# Patient Record
Sex: FEMALE | Race: WHITE | NOT HISPANIC OR LATINO | Employment: UNEMPLOYED | URBAN - METROPOLITAN AREA
[De-identification: names, ages, dates, MRNs, and addresses within clinical notes are randomized per-mention and may not be internally consistent; named-entity substitution may affect disease eponyms.]

---

## 2023-03-08 PROBLEM — R31.29 HEMATURIA, MICROSCOPIC: Status: ACTIVE | Noted: 2023-03-08

## 2023-03-08 PROBLEM — H01.123: Status: ACTIVE | Noted: 2023-03-08

## 2023-03-08 PROBLEM — N32.81 OAB (OVERACTIVE BLADDER): Status: ACTIVE | Noted: 2023-03-08

## 2023-03-08 PROBLEM — R91.1 LUNG NODULE: Status: ACTIVE | Noted: 2023-03-08

## 2023-03-08 PROBLEM — M48.02 CERVICAL SPINAL STENOSIS: Status: ACTIVE | Noted: 2023-03-08

## 2023-03-08 PROBLEM — M54.9 BACK PAIN WITH RADIATION: Status: ACTIVE | Noted: 2023-03-08

## 2023-03-08 PROBLEM — M32.9 SYSTEMIC LUPUS ERYTHEMATOSUS (MULTI): Status: ACTIVE | Noted: 2023-03-08

## 2023-03-08 PROBLEM — F32.A DEPRESSION: Status: ACTIVE | Noted: 2023-03-08

## 2023-03-08 PROBLEM — T88.7XXA MEDICATION SIDE EFFECT: Status: ACTIVE | Noted: 2023-03-08

## 2023-03-08 PROBLEM — M79.2 NEURITIS: Status: ACTIVE | Noted: 2023-03-08

## 2023-03-08 PROBLEM — K21.9 GERD (GASTROESOPHAGEAL REFLUX DISEASE): Status: ACTIVE | Noted: 2023-03-08

## 2023-03-08 PROBLEM — I10 HYPERTENSION: Status: ACTIVE | Noted: 2023-03-08

## 2023-03-08 PROBLEM — I25.10 CORONARY ARTERY DISEASE: Status: ACTIVE | Noted: 2023-03-08

## 2023-03-08 PROBLEM — F41.9 ANXIETY: Status: ACTIVE | Noted: 2023-03-08

## 2023-03-08 PROBLEM — M20.21 HALLUX RIGIDUS OF RIGHT FOOT: Status: ACTIVE | Noted: 2023-03-08

## 2023-03-08 PROBLEM — H01.126: Status: ACTIVE | Noted: 2023-03-08

## 2023-03-08 RX ORDER — METOPROLOL SUCCINATE 50 MG/1
1 TABLET, EXTENDED RELEASE ORAL DAILY
COMMUNITY
Start: 2019-03-06 | End: 2023-08-07

## 2023-03-08 RX ORDER — TRIAMCINOLONE ACETONIDE 1 MG/G
CREAM TOPICAL
COMMUNITY
Start: 2017-10-10

## 2023-03-08 RX ORDER — HYDROGEN PEROXIDE 3 %
SOLUTION, NON-ORAL MISCELLANEOUS
COMMUNITY
Start: 2021-02-24 | End: 2023-04-26

## 2023-03-09 ENCOUNTER — OFFICE VISIT (OUTPATIENT)
Dept: PRIMARY CARE | Facility: CLINIC | Age: 58
End: 2023-03-09
Payer: MEDICARE

## 2023-03-09 VITALS
BODY MASS INDEX: 28.73 KG/M2 | WEIGHT: 167.38 LBS | DIASTOLIC BLOOD PRESSURE: 90 MMHG | TEMPERATURE: 97.2 F | SYSTOLIC BLOOD PRESSURE: 160 MMHG

## 2023-03-09 DIAGNOSIS — M54.41 ACUTE MIDLINE LOW BACK PAIN WITH RIGHT-SIDED SCIATICA: ICD-10-CM

## 2023-03-09 DIAGNOSIS — K59.00 CONSTIPATION, UNSPECIFIED CONSTIPATION TYPE: Primary | ICD-10-CM

## 2023-03-09 PROCEDURE — 3077F SYST BP >= 140 MM HG: CPT | Performed by: INTERNAL MEDICINE

## 2023-03-09 PROCEDURE — 99213 OFFICE O/P EST LOW 20 MIN: CPT | Performed by: INTERNAL MEDICINE

## 2023-03-09 PROCEDURE — 3080F DIAST BP >= 90 MM HG: CPT | Performed by: INTERNAL MEDICINE

## 2023-03-09 PROCEDURE — 1036F TOBACCO NON-USER: CPT | Performed by: INTERNAL MEDICINE

## 2023-03-09 RX ORDER — LIDOCAINE 560 MG/1
1 PATCH PERCUTANEOUS; TOPICAL; TRANSDERMAL DAILY
Qty: 14 PATCH | Refills: 0 | Status: SHIPPED | OUTPATIENT
Start: 2023-03-09 | End: 2023-03-16

## 2023-03-09 RX ORDER — SUCRALFATE 1 G/1
1 TABLET ORAL EVERY 12 HOURS
COMMUNITY
Start: 2023-01-12 | End: 2023-04-14

## 2023-03-09 RX ORDER — ATORVASTATIN CALCIUM 40 MG/1
40 TABLET, FILM COATED ORAL DAILY
COMMUNITY
Start: 2023-02-22 | End: 2023-05-22

## 2023-03-09 RX ORDER — DOCUSATE SODIUM 100 MG/1
100 CAPSULE, LIQUID FILLED ORAL 2 TIMES DAILY
Qty: 60 CAPSULE | Refills: 0 | Status: SHIPPED | OUTPATIENT
Start: 2023-03-09 | End: 2023-04-08

## 2023-03-09 RX ORDER — TIZANIDINE 2 MG/1
2 TABLET ORAL EVERY 6 HOURS PRN
COMMUNITY
Start: 2023-03-07 | End: 2023-11-03 | Stop reason: WASHOUT

## 2023-03-09 RX ORDER — HYDROXYCHLOROQUINE SULFATE 200 MG/1
200 TABLET, FILM COATED ORAL DAILY
COMMUNITY
Start: 2023-01-08 | End: 2023-10-12

## 2023-03-09 ASSESSMENT — ENCOUNTER SYMPTOMS
MUSCULOSKELETAL NEGATIVE: 1
ALLERGIC/IMMUNOLOGIC NEGATIVE: 1
CARDIOVASCULAR NEGATIVE: 1
RESPIRATORY NEGATIVE: 1
GASTROINTESTINAL NEGATIVE: 1
CONSTITUTIONAL NEGATIVE: 1
ENDOCRINE NEGATIVE: 1
EYES NEGATIVE: 1
HEMATOLOGIC/LYMPHATIC NEGATIVE: 1
NEUROLOGICAL NEGATIVE: 1
PSYCHIATRIC NEGATIVE: 1

## 2023-03-09 ASSESSMENT — PAIN SCALES - GENERAL: PAINLEVEL: 10-WORST PAIN EVER

## 2023-03-09 ASSESSMENT — VISUAL ACUITY: OU: 1

## 2023-03-09 NOTE — ASSESSMENT & PLAN NOTE
Patient went to the urgent care clinic followed by [facility].  She had chronic back pain and she is given prescription for pain medication.  She has been scheduled to see a pain clinic with Dr. Arrieta next Tuesday.

## 2023-03-09 NOTE — ASSESSMENT & PLAN NOTE
She had a chronic constipation.  Advised to take fruits and vegetables.  Colace 100 mg twice daily ordered for 2 months.

## 2023-03-09 NOTE — PROGRESS NOTES
Subjective   Patient ID: [id number]   Neuropathy          Christa Christie is a 57 y.o. female who presents for Sciatica (Since Tuesday ).  Patient having chronic low back pain radiating towards the right buttock and knee.  It has been going for a while.  Today she went to [facility] and yesterday she went to urgent care clinic.  She does not have bowel bother movement problem.  She is scheduled to see pain clinic in future.        Objective   Visit Vitals  /90   Temp 36.2 °C (97.2 °F)      Review of Systems   Constitutional: Negative.    HENT: Negative.     Eyes: Negative.    Respiratory: Negative.     Cardiovascular: Negative.    Gastrointestinal: Negative.    Endocrine: Negative.    Genitourinary: Negative.    Musculoskeletal: Negative.         Low back pain.   Skin: Negative.    Allergic/Immunologic: Negative.    Neurological: Negative.    Hematological: Negative.    Psychiatric/Behavioral: Negative.       Physical Exam  Constitutional:       Appearance: Normal appearance. She is well-developed and normal weight.   HENT:      Head: Normocephalic and atraumatic.      Right Ear: Tympanic membrane and ear canal normal.      Left Ear: Tympanic membrane and ear canal normal.      Mouth/Throat:      Mouth: Mucous membranes are moist.      Pharynx: Oropharynx is clear.   Eyes:      General: Lids are normal. Lids are everted, no foreign bodies appreciated. Vision grossly intact.      Extraocular Movements: Extraocular movements intact.      Pupils: Pupils are equal, round, and reactive to light.   Neck:      Thyroid: No thyroid mass or thyroid tenderness.      Trachea: Trachea and phonation normal.   Cardiovascular:      Rate and Rhythm: Normal rate and regular rhythm.      Pulses:           Carotid pulses are 1+ on the right side and 1+ on the left side.       Radial pulses are 1+ on the right side and 1+ on the left side.        Femoral pulses are 1+ on the right side and 1+ on the left side.        Popliteal pulses are 1+ on the right side.      Heart sounds: Normal heart sounds, S1 normal and S2 normal.      No S3 sounds.   Pulmonary:      Effort: Pulmonary effort is normal.      Breath sounds: Normal breath sounds and air entry.   Abdominal:      General: Abdomen is flat. Bowel sounds are normal. There is no distension.      Palpations: Abdomen is soft. There is no mass.      Tenderness: There is no abdominal tenderness.   Musculoskeletal:         General: No swelling. Normal range of motion.      Cervical back: Normal range of motion and neck supple. No edema.      Right lower leg: No edema.   Lymphadenopathy:      Cervical: No cervical adenopathy.      Right cervical: No superficial cervical adenopathy.     Left cervical: No superficial cervical adenopathy.   Skin:     General: Skin is warm and dry.   Neurological:      General: No focal deficit present.      Mental Status: She is alert. Mental status is at baseline.   Psychiatric:         Attention and Perception: Attention normal.         Mood and Affect: Mood normal.         Behavior: Behavior is cooperative.         Assessment/Plan   Problem List Items Addressed This Visit          Digestive    Constipation - Primary     She had a chronic constipation.  Advised to take fruits and vegetables.  Colace 100 mg twice daily ordered for 2 months.         Relevant Medications    docusate sodium (Colace) 100 mg capsule       Other    Acute midline low back pain with right-sided sciatica     Patient went to the urgent care clinic followed by [facility].  She had chronic back pain and she is given prescription for pain medication.  She has been scheduled to see a pain clinic with Dr. Arrieta next Tuesday.         Relevant Medications    lidocaine (Salonpas, lidocaine,) 4 % patch       Anitha Ascencio MD

## 2023-04-13 DIAGNOSIS — K21.9 GASTRO-ESOPHAGEAL REFLUX DISEASE WITHOUT ESOPHAGITIS: ICD-10-CM

## 2023-04-14 RX ORDER — SUCRALFATE 1 G/1
TABLET ORAL
Qty: 180 TABLET | Refills: 0 | Status: SHIPPED | OUTPATIENT
Start: 2023-04-14 | End: 2023-07-11

## 2023-04-26 DIAGNOSIS — K21.9 GASTRO-ESOPHAGEAL REFLUX DISEASE WITHOUT ESOPHAGITIS: ICD-10-CM

## 2023-04-26 RX ORDER — HYDROGEN PEROXIDE 3 %
SOLUTION, NON-ORAL MISCELLANEOUS
Qty: 90 CAPSULE | Refills: 3 | Status: SHIPPED | OUTPATIENT
Start: 2023-04-26 | End: 2024-04-22

## 2023-05-03 LAB
ANION GAP IN SER/PLAS: 11 MMOL/L (ref 10–20)
CALCIUM (MG/DL) IN SER/PLAS: 9.9 MG/DL (ref 8.6–10.6)
CARBON DIOXIDE, TOTAL (MMOL/L) IN SER/PLAS: 31 MMOL/L (ref 21–32)
CHLORIDE (MMOL/L) IN SER/PLAS: 102 MMOL/L (ref 98–107)
CREATININE (MG/DL) IN SER/PLAS: 1.09 MG/DL (ref 0.5–1.05)
ERYTHROCYTE DISTRIBUTION WIDTH (RATIO) BY AUTOMATED COUNT: 11.6 % (ref 11.5–14.5)
ERYTHROCYTE MEAN CORPUSCULAR HEMOGLOBIN CONCENTRATION (G/DL) BY AUTOMATED: 33.2 G/DL (ref 32–36)
ERYTHROCYTE MEAN CORPUSCULAR VOLUME (FL) BY AUTOMATED COUNT: 99 FL (ref 80–100)
ERYTHROCYTES (10*6/UL) IN BLOOD BY AUTOMATED COUNT: 4.01 X10E12/L (ref 4–5.2)
GFR FEMALE: 59 ML/MIN/1.73M2
GLUCOSE (MG/DL) IN SER/PLAS: 100 MG/DL (ref 74–99)
HEMATOCRIT (%) IN BLOOD BY AUTOMATED COUNT: 39.8 % (ref 36–46)
HEMOGLOBIN (G/DL) IN BLOOD: 13.2 G/DL (ref 12–16)
LEUKOCYTES (10*3/UL) IN BLOOD BY AUTOMATED COUNT: 4.7 X10E9/L (ref 4.4–11.3)
NRBC (PER 100 WBCS) BY AUTOMATED COUNT: 0 /100 WBC (ref 0–0)
PLATELETS (10*3/UL) IN BLOOD AUTOMATED COUNT: 192 X10E9/L (ref 150–450)
POTASSIUM (MMOL/L) IN SER/PLAS: 4.3 MMOL/L (ref 3.5–5.3)
SODIUM (MMOL/L) IN SER/PLAS: 140 MMOL/L (ref 136–145)
UREA NITROGEN (MG/DL) IN SER/PLAS: 15 MG/DL (ref 6–23)

## 2023-05-21 DIAGNOSIS — I25.10 ATHEROSCLEROTIC HEART DISEASE OF NATIVE CORONARY ARTERY WITHOUT ANGINA PECTORIS: ICD-10-CM

## 2023-05-22 RX ORDER — ATORVASTATIN CALCIUM 40 MG/1
TABLET, FILM COATED ORAL
Qty: 90 TABLET | Refills: 0 | Status: SHIPPED | OUTPATIENT
Start: 2023-05-22 | End: 2023-08-21

## 2023-05-23 ENCOUNTER — OFFICE VISIT (OUTPATIENT)
Dept: PRIMARY CARE | Facility: CLINIC | Age: 58
End: 2023-05-23
Payer: MEDICARE

## 2023-05-23 VITALS
TEMPERATURE: 98 F | SYSTOLIC BLOOD PRESSURE: 124 MMHG | BODY MASS INDEX: 27.88 KG/M2 | WEIGHT: 162.4 LBS | DIASTOLIC BLOOD PRESSURE: 80 MMHG

## 2023-05-23 DIAGNOSIS — E78.2 MIXED HYPERLIPIDEMIA: Primary | ICD-10-CM

## 2023-05-23 PROCEDURE — 1036F TOBACCO NON-USER: CPT | Performed by: NURSE PRACTITIONER

## 2023-05-23 PROCEDURE — 3074F SYST BP LT 130 MM HG: CPT | Performed by: NURSE PRACTITIONER

## 2023-05-23 PROCEDURE — 3079F DIAST BP 80-89 MM HG: CPT | Performed by: NURSE PRACTITIONER

## 2023-05-23 PROCEDURE — 99214 OFFICE O/P EST MOD 30 MIN: CPT | Performed by: NURSE PRACTITIONER

## 2023-05-23 RX ORDER — ASPIRIN 81 MG/1
81 TABLET ORAL
COMMUNITY

## 2023-05-23 ASSESSMENT — PATIENT HEALTH QUESTIONNAIRE - PHQ9
SUM OF ALL RESPONSES TO PHQ9 QUESTIONS 1 AND 2: 0
2. FEELING DOWN, DEPRESSED OR HOPELESS: NOT AT ALL
1. LITTLE INTEREST OR PLEASURE IN DOING THINGS: NOT AT ALL

## 2023-05-23 NOTE — PROGRESS NOTES
Subjective   Patient ID: Christa Christie is a 57 y.o. female who presents for Follow-up.    HPI   In PT Saw Dr. Hoang  and Dr. Arrieta about low back.  Has not done labs.  Denies side effects to current medications.    Feeling OK day to day except some swelling in feet on and off.  No specific pattern.  Could do better with water intake.   [facility] for [facility] - no mail order    Review of Systems   All other systems reviewed and are negative.        Objective   /80   Temp 36.7 °C (98 °F)   Wt 73.7 kg (162 lb 6.4 oz)   BMI 27.88 kg/m²     Physical Exam  Vitals and nursing note reviewed.   Constitutional:       Appearance: Normal appearance.   HENT:      Right Ear: Tympanic membrane, ear canal and external ear normal.      Left Ear: Tympanic membrane, ear canal and external ear normal.      Mouth/Throat:      Pharynx: Oropharynx is clear.   Neck:      Thyroid: No thyroid mass, thyromegaly or thyroid tenderness.   Cardiovascular:      Rate and Rhythm: Normal rate and regular rhythm.      Pulses: Normal pulses.      Heart sounds: Normal heart sounds.   Pulmonary:      Effort: Pulmonary effort is normal.      Breath sounds: Normal breath sounds.   Abdominal:      General: Bowel sounds are normal.      Palpations: Abdomen is soft.   Musculoskeletal:      Cervical back: Normal range of motion and neck supple.   Lymphadenopathy:      Cervical: No cervical adenopathy.   Skin:     General: Skin is warm.   Neurological:      Mental Status: She is alert and oriented to person, place, and time. Mental status is at baseline.   Psychiatric:         Mood and Affect: Mood normal.         Behavior: Behavior normal.         Thought Content: Thought content normal.         Judgment: Judgment normal.         Assessment/Plan   Problem List Items Addressed This Visit       Mixed hyperlipidemia - Primary     Check labs          Talked about hydration and fluids.

## 2023-05-23 NOTE — PATIENT INSTRUCTIONS
Nice to see you today.  Labs after fasting 8 hours, hydrate and no supplements before the labs.  I will follow up via My Chart.  [facility]

## 2023-05-24 LAB
ALANINE AMINOTRANSFERASE (SGPT) (U/L) IN SER/PLAS: 19 U/L (ref 7–45)
ALBUMIN (G/DL) IN SER/PLAS: 4.3 G/DL (ref 3.4–5)
ALKALINE PHOSPHATASE (U/L) IN SER/PLAS: 68 U/L (ref 33–110)
ANION GAP IN SER/PLAS: 13 MMOL/L (ref 10–20)
ASPARTATE AMINOTRANSFERASE (SGOT) (U/L) IN SER/PLAS: 24 U/L (ref 9–39)
BILIRUBIN TOTAL (MG/DL) IN SER/PLAS: 0.8 MG/DL (ref 0–1.2)
CALCIUM (MG/DL) IN SER/PLAS: 9.5 MG/DL (ref 8.6–10.3)
CARBON DIOXIDE, TOTAL (MMOL/L) IN SER/PLAS: 29 MMOL/L (ref 21–32)
CHLORIDE (MMOL/L) IN SER/PLAS: 103 MMOL/L (ref 98–107)
CHOLESTEROL (MG/DL) IN SER/PLAS: 137 MG/DL (ref 0–199)
CHOLESTEROL IN HDL (MG/DL) IN SER/PLAS: 64.7 MG/DL
CHOLESTEROL/HDL RATIO: 2.1
CREATININE (MG/DL) IN SER/PLAS: 0.8 MG/DL (ref 0.5–1.05)
GFR FEMALE: 85 ML/MIN/1.73M2
GLUCOSE (MG/DL) IN SER/PLAS: 90 MG/DL (ref 74–99)
LDL: 59 MG/DL (ref 0–99)
POTASSIUM (MMOL/L) IN SER/PLAS: 4.1 MMOL/L (ref 3.5–5.3)
PROTEIN TOTAL: 7 G/DL (ref 6.4–8.2)
SODIUM (MMOL/L) IN SER/PLAS: 141 MMOL/L (ref 136–145)
TRIGLYCERIDE (MG/DL) IN SER/PLAS: 66 MG/DL (ref 0–149)
UREA NITROGEN (MG/DL) IN SER/PLAS: 15 MG/DL (ref 6–23)
VLDL: 13 MG/DL (ref 0–40)

## 2023-05-26 NOTE — RESULT ENCOUNTER NOTE
Please let pt know that labs look great.  Continue with the current dose of Statin.  She should follow up in 6 months (please schedule)  Let me know if she needs any other Rx

## 2023-07-10 DIAGNOSIS — K21.9 GASTRO-ESOPHAGEAL REFLUX DISEASE WITHOUT ESOPHAGITIS: ICD-10-CM

## 2023-07-11 RX ORDER — SUCRALFATE 1 G/1
TABLET ORAL
Qty: 180 TABLET | Refills: 0 | Status: SHIPPED | OUTPATIENT
Start: 2023-07-11 | End: 2023-10-11

## 2023-08-05 DIAGNOSIS — I10 ESSENTIAL (PRIMARY) HYPERTENSION: ICD-10-CM

## 2023-08-07 RX ORDER — METOPROLOL SUCCINATE 50 MG/1
50 TABLET, EXTENDED RELEASE ORAL DAILY
Qty: 90 TABLET | Refills: 1 | Status: SHIPPED | OUTPATIENT
Start: 2023-08-07 | End: 2024-02-05

## 2023-08-20 DIAGNOSIS — I25.10 ATHEROSCLEROTIC HEART DISEASE OF NATIVE CORONARY ARTERY WITHOUT ANGINA PECTORIS: ICD-10-CM

## 2023-08-21 RX ORDER — ATORVASTATIN CALCIUM 40 MG/1
TABLET, FILM COATED ORAL
Qty: 90 TABLET | Refills: 1 | Status: SHIPPED | OUTPATIENT
Start: 2023-08-21 | End: 2024-02-19

## 2023-10-11 DIAGNOSIS — K21.9 GASTRO-ESOPHAGEAL REFLUX DISEASE WITHOUT ESOPHAGITIS: ICD-10-CM

## 2023-10-11 RX ORDER — SUCRALFATE 1 G/1
TABLET ORAL
Qty: 180 TABLET | Refills: 0 | Status: SHIPPED | OUTPATIENT
Start: 2023-10-11 | End: 2023-12-21 | Stop reason: WASHOUT

## 2023-10-12 DIAGNOSIS — M32.9 SYSTEMIC LUPUS ERYTHEMATOSUS, UNSPECIFIED (MULTI): ICD-10-CM

## 2023-10-12 RX ORDER — HYDROXYCHLOROQUINE SULFATE 200 MG/1
TABLET, FILM COATED ORAL
Qty: 135 TABLET | Refills: 0 | Status: SHIPPED | OUTPATIENT
Start: 2023-10-12 | End: 2024-01-08

## 2023-11-03 ENCOUNTER — OFFICE VISIT (OUTPATIENT)
Dept: RHEUMATOLOGY | Facility: CLINIC | Age: 58
End: 2023-11-03
Payer: MEDICARE

## 2023-11-03 ENCOUNTER — LAB (OUTPATIENT)
Dept: LAB | Facility: LAB | Age: 58
End: 2023-11-03
Payer: MEDICARE

## 2023-11-03 VITALS — BODY MASS INDEX: 28.32 KG/M2 | SYSTOLIC BLOOD PRESSURE: 121 MMHG | DIASTOLIC BLOOD PRESSURE: 78 MMHG | WEIGHT: 165 LBS

## 2023-11-03 DIAGNOSIS — H01.126: ICD-10-CM

## 2023-11-03 DIAGNOSIS — H01.126: Primary | ICD-10-CM

## 2023-11-03 DIAGNOSIS — M32.8 OTHER FORMS OF SYSTEMIC LUPUS ERYTHEMATOSUS, UNSPECIFIED ORGAN INVOLVEMENT STATUS (MULTI): ICD-10-CM

## 2023-11-03 LAB
ALBUMIN SERPL BCP-MCNC: 4.4 G/DL (ref 3.4–5)
ALP SERPL-CCNC: 71 U/L (ref 33–110)
ALT SERPL W P-5'-P-CCNC: 19 U/L (ref 7–45)
ANION GAP SERPL CALC-SCNC: 14 MMOL/L (ref 10–20)
AST SERPL W P-5'-P-CCNC: 23 U/L (ref 9–39)
BILIRUB SERPL-MCNC: 0.8 MG/DL (ref 0–1.2)
BUN SERPL-MCNC: 14 MG/DL (ref 6–23)
C3 SERPL-MCNC: 115 MG/DL (ref 87–200)
C4 SERPL-MCNC: 27 MG/DL (ref 10–50)
CALCIUM SERPL-MCNC: 9.5 MG/DL (ref 8.6–10.6)
CHLORIDE SERPL-SCNC: 102 MMOL/L (ref 98–107)
CO2 SERPL-SCNC: 29 MMOL/L (ref 21–32)
CREAT SERPL-MCNC: 0.75 MG/DL (ref 0.5–1.05)
DSDNA AB SER-ACNC: <1 IU/ML
ERYTHROCYTE [DISTWIDTH] IN BLOOD BY AUTOMATED COUNT: 11.1 % (ref 11.5–14.5)
ERYTHROCYTE [SEDIMENTATION RATE] IN BLOOD BY WESTERGREN METHOD: 4 MM/H (ref 0–30)
GFR SERPL CREATININE-BSD FRML MDRD: >90 ML/MIN/1.73M*2
GLUCOSE SERPL-MCNC: 103 MG/DL (ref 74–99)
HCT VFR BLD AUTO: 37.8 % (ref 36–46)
HGB BLD-MCNC: 12.5 G/DL (ref 12–16)
MCH RBC QN AUTO: 34.5 PG (ref 26–34)
MCHC RBC AUTO-ENTMCNC: 33.1 G/DL (ref 32–36)
MCV RBC AUTO: 104 FL (ref 80–100)
NRBC BLD-RTO: 0 /100 WBCS (ref 0–0)
PLATELET # BLD AUTO: 198 X10*3/UL (ref 150–450)
POTASSIUM SERPL-SCNC: 4.3 MMOL/L (ref 3.5–5.3)
PROT SERPL-MCNC: 7 G/DL (ref 6.4–8.2)
RBC # BLD AUTO: 3.62 X10*6/UL (ref 4–5.2)
SODIUM SERPL-SCNC: 141 MMOL/L (ref 136–145)
WBC # BLD AUTO: 5.8 X10*3/UL (ref 4.4–11.3)

## 2023-11-03 PROCEDURE — 1036F TOBACCO NON-USER: CPT | Performed by: INTERNAL MEDICINE

## 2023-11-03 PROCEDURE — 85027 COMPLETE CBC AUTOMATED: CPT

## 2023-11-03 PROCEDURE — 86160 COMPLEMENT ANTIGEN: CPT

## 2023-11-03 PROCEDURE — 80053 COMPREHEN METABOLIC PANEL: CPT

## 2023-11-03 PROCEDURE — 3078F DIAST BP <80 MM HG: CPT | Performed by: INTERNAL MEDICINE

## 2023-11-03 PROCEDURE — 86225 DNA ANTIBODY NATIVE: CPT

## 2023-11-03 PROCEDURE — 85652 RBC SED RATE AUTOMATED: CPT

## 2023-11-03 PROCEDURE — 99213 OFFICE O/P EST LOW 20 MIN: CPT | Performed by: INTERNAL MEDICINE

## 2023-11-03 PROCEDURE — 3074F SYST BP LT 130 MM HG: CPT | Performed by: INTERNAL MEDICINE

## 2023-11-03 PROCEDURE — 36415 COLL VENOUS BLD VENIPUNCTURE: CPT

## 2023-11-03 NOTE — PROGRESS NOTES
"Subjective   Patient ID: Christa Christie is a 58 y.o. female who presents for Follow-up.    HPI.  58-year-old female with history of discoid lupus presented for follow-up.    She has no more discoid lupus rashes.    Right hand numbness improved after using the wrist brace.    Immunosuppression: Hydroxychloroquine.(5/2017).    Review of Systems   All other systems reviewed and are negative.    Objective .      3/14/2023     1:04 PM 4/25/2023     1:50 PM 5/3/2023     2:13 PM 5/15/2023     2:31 PM 5/23/2023     1:09 PM 7/3/2023     1:12 PM 11/3/2023     2:00 PM   Vitals   Systolic 147 151 122 132 124 139 121   Diastolic 77 82 79 75 80 75 78   Heart Rate 67 69  65  66    Temp 37.1 °C (98.7 °F) 36.6 °C (97.8 °F)  36.8 °C (98.2 °F) 36.7 °C (98 °F) 35.9 °C (96.6 °F)    Resp 16 16  18  18    Height (in)   1.626 m (5' 4\")   1.626 m (5' 4\")    Weight (lb)   166  162.4 166 165   BMI   28.49 kg/m2  27.88 kg/m2 28.49 kg/m2 28.32 kg/m2   BSA (m2)   1.84 m2  1.82 m2 1.84 m2 1.84 m2   Visit Report     Report  Report      Physical Exam.  Gen. AAO x3, NAD.  HEENT: No pallor or icterus, PERRLA, EOMI. No cervical lymphadenopathy .  Skin: No rashes.  Heart: S1, S2/ RRR.   Lungs: CTA B.  Abdomen: Soft, NT/ND  MSK: No swollen or tender joint.  Tinel's negative.  Neuro:  Sensation to touch intact.Strength 5/5 throughout.   Psych:Appropriate mood and behavior  EXT: No edema    Assessment/Plan   58-year-old female with history of discoid lupus presented for follow-up.    #1: Discoid lupus.  She is doing well.  -Continue hydroxychloroquine 300 mg daily.  Eye exam up to date.  -Labs.    #2: Possible right carpal tunnel syndrome.  -Continue to use wrist brace.    Follow-up in 6 months.     This note was partially generated using the Dragon Voice recognition system. There may be some incorrect wording, spelling and/or spelling errors or punctuation errors that were not corrected prior to committing the note to the medical record.    Patient " Active Problem List   Diagnosis    Anxiety    Bilateral asymmetric sensorineural hearing loss    Coronary artery disease    Cervical spinal stenosis    Depression    Discoid lupus erythematosus of left eyelid    Discoid lupus erythematosus of right eyelid    GERD (gastroesophageal reflux disease)    Hallux rigidus of right foot    Hematuria, microscopic    Hypertension    Back pain with radiation    Lung nodule    Medication side effect    Neuritis    OAB (overactive bladder)    Systemic lupus erythematosus (CMS/HCC)    Constipation    Acute midline low back pain with right-sided sciatica    Mixed hyperlipidemia      Past Surgical History:   Procedure Laterality Date    BACK SURGERY  10/22/2014    Back Surgery     SECTION, CLASSIC  2022     Section    OTHER SURGICAL HISTORY  06/10/2020    Tonsillectomy with adenoidectomy    OTHER SURGICAL HISTORY  06/10/2020    Foot surgery      Social History     Tobacco Use    Smoking status: Former     Packs/day: 1.00     Years: 30.00     Additional pack years: 0.00     Total pack years: 30.00     Types: Cigarettes     Quit date: 2017     Years since quittin.8     Passive exposure: Past    Smokeless tobacco: Never   Substance Use Topics    Alcohol use: Yes     Alcohol/week: 2.0 standard drinks of alcohol     Types: 2 Glasses of wine per week      Family History   Problem Relation Name Age of Onset    Other (cardiac disorder) Mother      Lung cancer Mother  85    Other (cardiac disorder) Father        No Known Allergies   Current Outpatient Medications   Medication Instructions    aspirin 81 mg, oral, Daily RT    atorvastatin (Lipitor) 40 mg tablet TAKE 1 TABLET BY MOUTH EVERY DAY    esomeprazole (NexIUM) 20 mg DR capsule TAKE 1 CAPSULE BY MOUTH ONCE DAILY BEFORE EATING    hydroxychloroquine (Plaquenil) 200 mg tablet TAKE 1& 1/2 TABLETS BY MOUTH DAILY    metoprolol succinate XL (TOPROL-XL) 50 mg, oral, Daily    sucralfate (Carafate) 1 gram tablet TAKE  1 TABLET BY MOUTH EVERY 12 HOURS DAILY    triamcinolone (Kenalog) 0.1 % cream APPLY AND RUB IN A THIN FILM TO AFFECTED AREAS TWICE DAILY.(AM AND PM).

## 2023-11-03 NOTE — PATIENT INSTRUCTIONS
Continue hydroxychloroquine 1-1/2 pill daily.  Use wrist brace at night.  Call me if any question.  Follow-up in 6 months.

## 2023-12-08 ENCOUNTER — OFFICE VISIT (OUTPATIENT)
Dept: PRIMARY CARE | Facility: CLINIC | Age: 58
End: 2023-12-08
Payer: MEDICARE

## 2023-12-08 VITALS
WEIGHT: 162 LBS | TEMPERATURE: 97.8 F | SYSTOLIC BLOOD PRESSURE: 120 MMHG | DIASTOLIC BLOOD PRESSURE: 78 MMHG | BODY MASS INDEX: 27.81 KG/M2 | OXYGEN SATURATION: 96 % | HEART RATE: 66 BPM

## 2023-12-08 DIAGNOSIS — R19.4 BOWEL HABIT CHANGES: Primary | ICD-10-CM

## 2023-12-08 PROBLEM — M53.3 SACROILIAC PAIN: Status: ACTIVE | Noted: 2023-12-08

## 2023-12-08 PROBLEM — M43.16 ANTEROLISTHESIS OF LUMBAR SPINE: Status: ACTIVE | Noted: 2023-12-08

## 2023-12-08 PROBLEM — M54.16 RIGHT LUMBAR RADICULOPATHY: Status: ACTIVE | Noted: 2023-12-08

## 2023-12-08 PROBLEM — I25.10 CAD (CORONARY ARTERY DISEASE): Status: ACTIVE | Noted: 2023-12-08

## 2023-12-08 PROBLEM — M70.61 TROCHANTERIC BURSITIS OF RIGHT HIP: Status: ACTIVE | Noted: 2023-12-08

## 2023-12-08 PROBLEM — R10.31 SEVERE RT GROIN PAIN: Status: ACTIVE | Noted: 2023-12-08

## 2023-12-08 PROCEDURE — 1036F TOBACCO NON-USER: CPT | Performed by: NURSE PRACTITIONER

## 2023-12-08 PROCEDURE — 99214 OFFICE O/P EST MOD 30 MIN: CPT | Performed by: NURSE PRACTITIONER

## 2023-12-08 PROCEDURE — 3078F DIAST BP <80 MM HG: CPT | Performed by: NURSE PRACTITIONER

## 2023-12-08 PROCEDURE — 3074F SYST BP LT 130 MM HG: CPT | Performed by: NURSE PRACTITIONER

## 2023-12-08 NOTE — PROGRESS NOTES
Subjective   Patient ID: Christa Christie is a 58 y.o. female who presents for Constipation (For the the last month, then last last and this morning had diarrhea ).    HPI   Had some constipation and then today - diarrhea.  About a month ago was constipated  When she had BM  small balls of stool  Last Friday took stool softener and then had BM Friday night  Then back to back more normal stools   Then Diarrhea last night and this AM  Not sure what to do   No vomiting or nausea    Toast and spray margarine   Goes to the [employer] rides the bike  Left overs for lunch  Apple trisketts celery  Chicken, Lots of vegetables for dinner    Years ago Urologist recommended a bulking agent b/c was having bladder symptoms due to constipation.   Water - 4-5 per day  Tea 2 per day  Nocturia x 1    Review of Systems   Gastrointestinal:  Positive for constipation and diarrhea.   All other systems reviewed and are negative.      Objective   /78   Pulse 66   Temp 36.6 °C (97.8 °F)   Wt 73.5 kg (162 lb)   SpO2 96%   BMI 27.81 kg/m²     Physical Exam  Vitals and nursing note reviewed.   Constitutional:       Appearance: Normal appearance.   HENT:      Head: Normocephalic and atraumatic.      Right Ear: Tympanic membrane, ear canal and external ear normal.      Left Ear: Tympanic membrane, ear canal and external ear normal.      Mouth/Throat:      Pharynx: Oropharynx is clear.   Neck:      Thyroid: No thyroid mass, thyromegaly or thyroid tenderness.   Cardiovascular:      Rate and Rhythm: Normal rate and regular rhythm.      Pulses: Normal pulses.      Heart sounds: Normal heart sounds.   Pulmonary:      Effort: Pulmonary effort is normal.      Breath sounds: Normal breath sounds.   Abdominal:      General: Bowel sounds are normal.      Palpations: Abdomen is soft.   Neurological:      Mental Status: She is alert and oriented to person, place, and time.   Psychiatric:         Mood and Affect: Mood normal.         Behavior:  Behavior normal.         Assessment/Plan   Problem List Items Addressed This Visit             ICD-10-CM    Bowel habit changes - Primary R19.4   Make a slurry of Metamucil/Benefiber or MiraLax   Consistently use this to bulk the stools and keep them more regular.

## 2023-12-21 PROBLEM — R19.4 BOWEL HABIT CHANGES: Status: ACTIVE | Noted: 2023-03-09

## 2023-12-21 ASSESSMENT — ENCOUNTER SYMPTOMS
DIARRHEA: 1
CONSTIPATION: 1

## 2023-12-21 NOTE — PATIENT INSTRUCTIONS
Make a slurry of Metamucil/Benefiber or MiraLax   Consistently use this to bulk the stools and keep them more regular.  Stay well hydrated  If not improving let me know.

## 2024-01-08 DIAGNOSIS — M32.9 SYSTEMIC LUPUS ERYTHEMATOSUS, UNSPECIFIED (MULTI): ICD-10-CM

## 2024-01-08 RX ORDER — HYDROXYCHLOROQUINE SULFATE 200 MG/1
TABLET, FILM COATED ORAL
Qty: 135 TABLET | Refills: 0 | Status: SHIPPED | OUTPATIENT
Start: 2024-01-08 | End: 2024-04-09 | Stop reason: SDUPTHER

## 2024-01-12 ENCOUNTER — TELEPHONE (OUTPATIENT)
Dept: RHEUMATOLOGY | Facility: CLINIC | Age: 59
End: 2024-01-12
Payer: MEDICARE

## 2024-01-15 DIAGNOSIS — R19.4 BOWEL HABIT CHANGES: Primary | ICD-10-CM

## 2024-01-16 RX ORDER — SUCRALFATE 1 G/10ML
1 SUSPENSION ORAL 4 TIMES DAILY
COMMUNITY
End: 2024-01-16 | Stop reason: SDUPTHER

## 2024-01-17 RX ORDER — SUCRALFATE 1 G/10ML
1 SUSPENSION ORAL EVERY 12 HOURS
Qty: 90 ML | Refills: 0 | Status: SHIPPED | OUTPATIENT
Start: 2024-01-17 | End: 2024-01-25 | Stop reason: WASHOUT

## 2024-01-22 ENCOUNTER — TELEPHONE (OUTPATIENT)
Dept: PRIMARY CARE | Facility: CLINIC | Age: 59
End: 2024-01-22
Payer: MEDICARE

## 2024-01-22 NOTE — TELEPHONE ENCOUNTER
Patient would like to know if she could get sucralfate in tablet form. She only has a couple doses left of the liquid that was prescribed for her.

## 2024-01-25 DIAGNOSIS — R19.4 BOWEL HABIT CHANGES: Primary | ICD-10-CM

## 2024-01-25 RX ORDER — SUCRALFATE 1 G/1
1 TABLET ORAL
Qty: 180 TABLET | Refills: 0 | Status: SHIPPED | OUTPATIENT
Start: 2024-01-25 | End: 2024-04-17

## 2024-02-03 DIAGNOSIS — I10 ESSENTIAL (PRIMARY) HYPERTENSION: ICD-10-CM

## 2024-02-05 RX ORDER — METOPROLOL SUCCINATE 50 MG/1
50 TABLET, EXTENDED RELEASE ORAL DAILY
Qty: 90 TABLET | Refills: 1 | Status: SHIPPED | OUTPATIENT
Start: 2024-02-05

## 2024-02-17 DIAGNOSIS — I25.10 ATHEROSCLEROTIC HEART DISEASE OF NATIVE CORONARY ARTERY WITHOUT ANGINA PECTORIS: ICD-10-CM

## 2024-02-19 RX ORDER — ATORVASTATIN CALCIUM 40 MG/1
TABLET, FILM COATED ORAL
Qty: 90 TABLET | Refills: 1 | Status: SHIPPED | OUTPATIENT
Start: 2024-02-19 | End: 2024-05-19

## 2024-02-19 NOTE — TELEPHONE ENCOUNTER
Called and left pt a detailed message that she is due for labs and an apt. Let her know to call and ach apt.

## 2024-03-03 DIAGNOSIS — Z87.891 FORMER SMOKER: Primary | ICD-10-CM

## 2024-03-03 DIAGNOSIS — R91.1 LUNG NODULE: ICD-10-CM

## 2024-03-19 ENCOUNTER — HOSPITAL ENCOUNTER (OUTPATIENT)
Dept: RADIOLOGY | Facility: CLINIC | Age: 59
Discharge: HOME | End: 2024-03-19
Payer: MEDICARE

## 2024-03-19 DIAGNOSIS — R91.1 LUNG NODULE: ICD-10-CM

## 2024-03-19 DIAGNOSIS — Z87.891 FORMER SMOKER: ICD-10-CM

## 2024-03-19 PROCEDURE — 71271 CT THORAX LUNG CANCER SCR C-: CPT

## 2024-04-09 ENCOUNTER — TELEPHONE (OUTPATIENT)
Dept: RHEUMATOLOGY | Facility: CLINIC | Age: 59
End: 2024-04-09
Payer: MEDICARE

## 2024-04-09 DIAGNOSIS — M32.9 SYSTEMIC LUPUS ERYTHEMATOSUS, UNSPECIFIED (MULTI): ICD-10-CM

## 2024-04-09 RX ORDER — HYDROXYCHLOROQUINE SULFATE 200 MG/1
TABLET, FILM COATED ORAL
Qty: 135 TABLET | Refills: 0 | Status: SHIPPED | OUTPATIENT
Start: 2024-04-09

## 2024-04-09 NOTE — TELEPHONE ENCOUNTER
Patient called for a refill on    Hydroxychloroquine 200 mg #135 take 1 and 1/2 tablets daily    [phone]

## 2024-04-16 DIAGNOSIS — R19.4 BOWEL HABIT CHANGES: ICD-10-CM

## 2024-04-17 RX ORDER — SUCRALFATE 1 G/1
1 TABLET ORAL
Qty: 180 TABLET | Refills: 0 | Status: SHIPPED | OUTPATIENT
Start: 2024-04-17 | End: 2025-04-17

## 2024-04-20 DIAGNOSIS — K21.9 GASTRO-ESOPHAGEAL REFLUX DISEASE WITHOUT ESOPHAGITIS: ICD-10-CM

## 2024-04-22 RX ORDER — HYDROGEN PEROXIDE 3 %
SOLUTION, NON-ORAL MISCELLANEOUS
Qty: 90 CAPSULE | Refills: 3 | Status: SHIPPED | OUTPATIENT
Start: 2024-04-22

## 2024-04-29 ENCOUNTER — OFFICE VISIT (OUTPATIENT)
Dept: PRIMARY CARE | Facility: CLINIC | Age: 59
End: 2024-04-29
Payer: MEDICARE

## 2024-04-29 VITALS
DIASTOLIC BLOOD PRESSURE: 70 MMHG | SYSTOLIC BLOOD PRESSURE: 120 MMHG | TEMPERATURE: 97.7 F | WEIGHT: 166 LBS | BODY MASS INDEX: 28.34 KG/M2 | HEIGHT: 64 IN

## 2024-04-29 DIAGNOSIS — Z12.31 ENCOUNTER FOR SCREENING MAMMOGRAM FOR MALIGNANT NEOPLASM OF BREAST: ICD-10-CM

## 2024-04-29 DIAGNOSIS — Z00.00 MEDICARE ANNUAL WELLNESS VISIT, SUBSEQUENT: Primary | ICD-10-CM

## 2024-04-29 DIAGNOSIS — M32.9 SYSTEMIC LUPUS ERYTHEMATOSUS, UNSPECIFIED SLE TYPE, UNSPECIFIED ORGAN INVOLVEMENT STATUS (MULTI): ICD-10-CM

## 2024-04-29 DIAGNOSIS — R91.1 LUNG NODULE: ICD-10-CM

## 2024-04-29 DIAGNOSIS — G61.9 INFLAMMATORY NEUROPATHY (MULTI): ICD-10-CM

## 2024-04-29 DIAGNOSIS — H01.126: ICD-10-CM

## 2024-04-29 DIAGNOSIS — M48.02 CERVICAL SPINAL STENOSIS: ICD-10-CM

## 2024-04-29 DIAGNOSIS — I25.10 CORONARY ARTERY DISEASE INVOLVING NATIVE HEART WITHOUT ANGINA PECTORIS, UNSPECIFIED VESSEL OR LESION TYPE: ICD-10-CM

## 2024-04-29 DIAGNOSIS — F19.21 HISTORY OF SUBSTANCE DEPENDENCE (MULTI): ICD-10-CM

## 2024-04-29 DIAGNOSIS — J01.00 ACUTE NON-RECURRENT MAXILLARY SINUSITIS: ICD-10-CM

## 2024-04-29 PROCEDURE — G0439 PPPS, SUBSEQ VISIT: HCPCS | Performed by: FAMILY MEDICINE

## 2024-04-29 PROCEDURE — 3078F DIAST BP <80 MM HG: CPT | Performed by: FAMILY MEDICINE

## 2024-04-29 PROCEDURE — 99214 OFFICE O/P EST MOD 30 MIN: CPT | Performed by: FAMILY MEDICINE

## 2024-04-29 PROCEDURE — 3074F SYST BP LT 130 MM HG: CPT | Performed by: FAMILY MEDICINE

## 2024-04-29 PROCEDURE — 1036F TOBACCO NON-USER: CPT | Performed by: FAMILY MEDICINE

## 2024-04-29 RX ORDER — AMOXICILLIN AND CLAVULANATE POTASSIUM 875; 125 MG/1; MG/1
875 TABLET, FILM COATED ORAL 2 TIMES DAILY
Qty: 14 TABLET | Refills: 0 | Status: SHIPPED | OUTPATIENT
Start: 2024-04-29 | End: 2024-05-06

## 2024-04-29 ASSESSMENT — PATIENT HEALTH QUESTIONNAIRE - PHQ9
SUM OF ALL RESPONSES TO PHQ9 QUESTIONS 1 AND 2: 0
1. LITTLE INTEREST OR PLEASURE IN DOING THINGS: NOT AT ALL
2. FEELING DOWN, DEPRESSED OR HOPELESS: NOT AT ALL
1. LITTLE INTEREST OR PLEASURE IN DOING THINGS: NOT AT ALL
SUM OF ALL RESPONSES TO PHQ9 QUESTIONS 1 AND 2: 0
2. FEELING DOWN, DEPRESSED OR HOPELESS: NOT AT ALL

## 2024-04-29 ASSESSMENT — ENCOUNTER SYMPTOMS
NAUSEA: 0
SLEEP DISTURBANCE: 0
ACTIVITY CHANGE: 0
SHORTNESS OF BREATH: 0
WOUND: 0
SINUS PAIN: 1
DIARRHEA: 0
APPETITE CHANGE: 0
CONSTIPATION: 1
NERVOUS/ANXIOUS: 0
EYE PAIN: 0
BLOOD IN STOOL: 0
DYSURIA: 0
LIGHT-HEADEDNESS: 0
ABDOMINAL PAIN: 0
DIZZINESS: 0
COUGH: 1
SORE THROAT: 0
DYSPHORIC MOOD: 0
JOINT SWELLING: 0
ARTHRALGIAS: 0
FATIGUE: 0
PALPITATIONS: 0
VOMITING: 0
HEADACHES: 0
SINUS PRESSURE: 1

## 2024-04-29 ASSESSMENT — ACTIVITIES OF DAILY LIVING (ADL)
TAKING_MEDICATION: INDEPENDENT
DRESSING: INDEPENDENT
GROCERY_SHOPPING: INDEPENDENT
DOING_HOUSEWORK: INDEPENDENT
MANAGING_FINANCES: INDEPENDENT
BATHING: INDEPENDENT

## 2024-04-29 NOTE — PROGRESS NOTES
Subjective   Reason for Visit: Christa Christie is an 58 y.o. female here for a Medicare Wellness visit.     **Care team  Rheumatology- Dr. Strauss- following for lupus - sx well controlled by pt report   optho- on Plaquenil   ENT/Audiology- following regularly due to chronic hearing loss. wears hearing aides   GI- Dr. Chang     GERD- had egd in past 2016. Symptoms have been better on on nexium   protonix didn't help   Working on food changes for constipation. Still aheing some hard stools. Known to dr Aquino but no recent appt      Hypertension- well-controlled on metoprolol. No concerning side effects     Hyperlipidemia and asymptomatic coronary artery atherosclerosis-tolerating aspirin and atorvastatin without any issues. No bleeding concerns     Lung nodules- hx smoking quit in 2017- due 3/25     mammo due   pap done 8/22 NILM/ HPV negative   Colonoscopy 2021- due 2026    Sick x 2 weeks  Still with drainage and productive cough in am   No meds     Past Medical, Surgical, and Family History reviewed and updated in chart.    Reviewed all medications by prescribing practitioner or clinical pharmacist (such as prescriptions, OTCs, herbal therapies and supplements) and documented in the medical record.    HPI    Patient Self Assessment of Health Status  Patient Self Assessment: Fair    Nutrition and Exercise  Current Diet: Well Balanced Diet  Adequate Fluid Intake: Yes  Caffeine: Yes  Exercise Frequency: Regularly    Functional Ability/Level of Safety  Cognitive Impairment Observed: No cognitive impairment observed  Cognitive Impairment Reported: No cognitive impairment reported by patient or family    Home Safety Risk Factors: None    Patient Care Team:  Troy Da Silva DO as PCP - General  Troy Da Silva DO as PCP - [employer] PCP     Review of Systems   Constitutional:  Negative for activity change, appetite change and fatigue.   HENT:  Positive for hearing loss (chronic), postnasal drip, sinus  "pressure and sinus pain. Negative for congestion, ear discharge, ear pain, sore throat and tinnitus.    Eyes:  Negative for pain and visual disturbance.   Respiratory:  Positive for cough. Negative for shortness of breath.    Cardiovascular:  Negative for chest pain, palpitations and leg swelling.   Gastrointestinal:  Positive for constipation. Negative for abdominal pain, blood in stool, diarrhea, nausea and vomiting.   Endocrine: Negative for cold intolerance and heat intolerance.   Genitourinary:  Negative for dysuria and menstrual problem.   Musculoskeletal:  Negative for arthralgias and joint swelling.   Skin:  Negative for rash and wound.   Neurological:  Negative for dizziness, light-headedness and headaches.   Psychiatric/Behavioral:  Negative for dysphoric mood and sleep disturbance. The patient is not nervous/anxious.        Objective   Vitals:  /70   Temp 36.5 °C (97.7 °F)   Ht 1.626 m (5' 4\")   Wt 75.3 kg (166 lb)   BMI 28.49 kg/m²       Physical Exam  Vitals and nursing note reviewed.   Constitutional:       Appearance: Normal appearance. She is normal weight.   HENT:      Head: Normocephalic and atraumatic.      Right Ear: External ear normal.      Left Ear: External ear normal.      Ears:      Comments: Hearing aides in     Nose:      Right Sinus: Maxillary sinus tenderness present. No frontal sinus tenderness.      Left Sinus: Maxillary sinus tenderness present. No frontal sinus tenderness.      Mouth/Throat:      Mouth: Mucous membranes are moist.   Eyes:      Conjunctiva/sclera: Conjunctivae normal.   Cardiovascular:      Rate and Rhythm: Normal rate and regular rhythm.      Pulses: Normal pulses.      Heart sounds: Normal heart sounds.   Pulmonary:      Effort: Pulmonary effort is normal.      Breath sounds: Normal breath sounds.   Abdominal:      General: Abdomen is flat. Bowel sounds are normal.      Palpations: Abdomen is soft.   Musculoskeletal:         General: No swelling.      " Cervical back: Normal range of motion and neck supple.      Right lower leg: No edema.      Left lower leg: No edema.   Lymphadenopathy:      Cervical: No cervical adenopathy.   Skin:     General: Skin is warm and dry.      Capillary Refill: Capillary refill takes less than 2 seconds.   Neurological:      General: No focal deficit present.      Mental Status: She is alert. Mental status is at baseline.      Deep Tendon Reflexes: Reflexes normal.   Psychiatric:         Mood and Affect: Mood normal.         Behavior: Behavior normal.         Thought Content: Thought content normal.         Judgment: Judgment normal.         Assessment/Plan   Problem List Items Addressed This Visit       Coronary artery disease    Relevant Orders    Referral to Cardiology    Lipid Panel    Comprehensive Metabolic Panel    Cervical spinal stenosis    Relevant Orders    Disability Placard    Discoid lupus erythematosus of left eyelid    Lung nodule    Systemic lupus erythematosus (Multi)    History of substance dependence (Multi)     Other Visit Diagnoses       Medicare annual wellness visit, subsequent    -  Primary    Relevant Orders    BI mammo bilateral screening tomosynthesis    Encounter for screening mammogram for malignant neoplasm of breast        Relevant Orders    BI mammo bilateral screening tomosynthesis    Acute non-recurrent maxillary sinusitis        Relevant Medications    amoxicillin-pot clavulanate (Augmentin) 875-125 mg tablet    Inflammatory neuropathy (Multi)              Overall doing well   Tx sinus infection as above. Call INB  Cont work on diet for hard stools- back to GI INB  Ref cardio for severe cAD on CT. No sx  Lung nodule ct due 3/25

## 2024-04-30 ENCOUNTER — LAB (OUTPATIENT)
Dept: LAB | Facility: LAB | Age: 59
End: 2024-04-30
Payer: MEDICARE

## 2024-04-30 DIAGNOSIS — I25.10 CORONARY ARTERY DISEASE INVOLVING NATIVE HEART WITHOUT ANGINA PECTORIS, UNSPECIFIED VESSEL OR LESION TYPE: ICD-10-CM

## 2024-04-30 LAB
ALBUMIN SERPL BCP-MCNC: 4.2 G/DL (ref 3.4–5)
ALP SERPL-CCNC: 69 U/L (ref 33–110)
ALT SERPL W P-5'-P-CCNC: 16 U/L (ref 7–45)
ANION GAP SERPL CALC-SCNC: 10 MMOL/L (ref 10–20)
AST SERPL W P-5'-P-CCNC: 21 U/L (ref 9–39)
BILIRUB SERPL-MCNC: 0.7 MG/DL (ref 0–1.2)
BUN SERPL-MCNC: 18 MG/DL (ref 6–23)
CALCIUM SERPL-MCNC: 9.5 MG/DL (ref 8.6–10.3)
CHLORIDE SERPL-SCNC: 104 MMOL/L (ref 98–107)
CHOLEST SERPL-MCNC: 150 MG/DL (ref 0–199)
CHOLESTEROL/HDL RATIO: 2.3
CO2 SERPL-SCNC: 30 MMOL/L (ref 21–32)
CREAT SERPL-MCNC: 0.84 MG/DL (ref 0.5–1.05)
EGFRCR SERPLBLD CKD-EPI 2021: 80 ML/MIN/1.73M*2
GLUCOSE SERPL-MCNC: 101 MG/DL (ref 74–99)
HDLC SERPL-MCNC: 66.5 MG/DL
LDLC SERPL CALC-MCNC: 68 MG/DL
NON HDL CHOLESTEROL: 84 MG/DL (ref 0–149)
POTASSIUM SERPL-SCNC: 4.4 MMOL/L (ref 3.5–5.3)
PROT SERPL-MCNC: 7 G/DL (ref 6.4–8.2)
SODIUM SERPL-SCNC: 140 MMOL/L (ref 136–145)
TRIGL SERPL-MCNC: 78 MG/DL (ref 0–149)
VLDL: 16 MG/DL (ref 0–40)

## 2024-04-30 PROCEDURE — 80053 COMPREHEN METABOLIC PANEL: CPT

## 2024-04-30 PROCEDURE — 80061 LIPID PANEL: CPT

## 2024-04-30 PROCEDURE — 36415 COLL VENOUS BLD VENIPUNCTURE: CPT

## 2024-05-02 ENCOUNTER — TELEPHONE (OUTPATIENT)
Dept: PRIMARY CARE | Facility: CLINIC | Age: 59
End: 2024-05-02
Payer: MEDICARE

## 2024-05-02 NOTE — TELEPHONE ENCOUNTER
Okay per HIPAA to leave a detailed message.  Left voicemail relaying message from provider. Updated appt note for next visit to IO A1C

## 2024-05-02 NOTE — TELEPHONE ENCOUNTER
----- Message from Troy Da Silva DO sent at 5/2/2024 11:25 AM EDT -----  Please call the patient regarding her abnormal result.  Let patient know labs overall okay.  Sugar at upper end of normal.  I think we should do an IO A1c to see the average of her sugar at her next follow-up.  Can you please note this for her appointment

## 2024-05-03 ENCOUNTER — LAB (OUTPATIENT)
Dept: LAB | Facility: LAB | Age: 59
End: 2024-05-03
Payer: MEDICARE

## 2024-05-03 ENCOUNTER — OFFICE VISIT (OUTPATIENT)
Dept: RHEUMATOLOGY | Facility: CLINIC | Age: 59
End: 2024-05-03
Payer: MEDICARE

## 2024-05-03 VITALS — DIASTOLIC BLOOD PRESSURE: 78 MMHG | WEIGHT: 166 LBS | SYSTOLIC BLOOD PRESSURE: 130 MMHG | BODY MASS INDEX: 28.49 KG/M2

## 2024-05-03 DIAGNOSIS — M32.19 SYSTEMIC LUPUS ERYTHEMATOSUS WITH OTHER ORGAN INVOLVEMENT, UNSPECIFIED SLE TYPE (MULTI): ICD-10-CM

## 2024-05-03 DIAGNOSIS — H01.126: ICD-10-CM

## 2024-05-03 DIAGNOSIS — H01.126: Primary | ICD-10-CM

## 2024-05-03 LAB
ERYTHROCYTE [DISTWIDTH] IN BLOOD BY AUTOMATED COUNT: 11.4 % (ref 11.5–14.5)
HCT VFR BLD AUTO: 37.9 % (ref 36–46)
HGB BLD-MCNC: 12.9 G/DL (ref 12–16)
MCH RBC QN AUTO: 33.9 PG (ref 26–34)
MCHC RBC AUTO-ENTMCNC: 34 G/DL (ref 32–36)
MCV RBC AUTO: 100 FL (ref 80–100)
NRBC BLD-RTO: 0 /100 WBCS (ref 0–0)
PLATELET # BLD AUTO: 203 X10*3/UL (ref 150–450)
RBC # BLD AUTO: 3.8 X10*6/UL (ref 4–5.2)
WBC # BLD AUTO: 5.3 X10*3/UL (ref 4.4–11.3)

## 2024-05-03 PROCEDURE — 1036F TOBACCO NON-USER: CPT | Performed by: INTERNAL MEDICINE

## 2024-05-03 PROCEDURE — 99213 OFFICE O/P EST LOW 20 MIN: CPT | Performed by: INTERNAL MEDICINE

## 2024-05-03 PROCEDURE — 85027 COMPLETE CBC AUTOMATED: CPT

## 2024-05-03 PROCEDURE — 3078F DIAST BP <80 MM HG: CPT | Performed by: INTERNAL MEDICINE

## 2024-05-03 PROCEDURE — 3075F SYST BP GE 130 - 139MM HG: CPT | Performed by: INTERNAL MEDICINE

## 2024-05-03 PROCEDURE — 36415 COLL VENOUS BLD VENIPUNCTURE: CPT

## 2024-05-03 NOTE — PATIENT INSTRUCTIONS
Continue hydroxychloroquine 1 and half pill daily.  Call me if any question.  Follow-up in 6 months or sooner if needed.

## 2024-05-03 NOTE — PROGRESS NOTES
Subjective . Christa Christie is a 58 y.o. female who presents for Follow-up and Lupus.    Lupus    .58-year-old female with history of discoid lupus presented for follow-up.   She states that she is feeling fine.  She has no rash involving the eyelids and temples.  However eyelid drooping.  She is following ophthalmology and medical follow-up eyelid lift surgery.      Immunosuppression: Hydroxychloroquine.(5/2017).       Review of Systems   All other systems reviewed and are negative.    Objective     Blood pressure 130/78, weight 75.3 kg (166 lb).    Physical Exam.  Gen. AAO x3, NAD.  HEENT: No pallor or icterus, PERRLA, EOMI.  No cervical lymphadenopathy .  Skin: No rashes.  Heart: S1, S2/ RRR.   Lungs: CTA B.  Abdomen: Soft, NT/ND.  MSK: No swollen or tender joint.   Neuro: Sensation to touch intact.Strength 5/5 throughout.   Psych:Appropriate mood and behavior  EXT: No edema    Assessment/Plan  . .58-year-old female with history of discoid lupus presented for follow-up.       #1: Discoid lupus.  Stable.  -Continue hydroxychloroquine 1 and half pill daily.  Eye exam up-to-date.  -Labs.    Follow-up in 6 months.     This note was partially generated using the Dragon Voice recognition system. There may be some incorrect wording, spelling and/or spelling errors or punctuation errors that were not corrected prior to committing the note to the medical record.      Problem List Items Addressed This Visit       Discoid lupus erythematosus of left eyelid - Primary    Relevant Orders    CBC    Systemic lupus erythematosus (Multi)    Relevant Orders    CBC            Active Ambulatory Problems     Diagnosis Date Noted    Anxiety 03/08/2023    Bilateral asymmetric sensorineural hearing loss 03/08/2023    Coronary artery disease 03/08/2023    Cervical spinal stenosis 03/08/2023    Depression 03/08/2023    Discoid lupus erythematosus of left eyelid 03/08/2023    Discoid lupus erythematosus of right eyelid 03/08/2023    GERD  (gastroesophageal reflux disease) 2023    Hallux rigidus of right foot 2023    Hematuria, microscopic 2023    Hypertension 2023    Back pain with radiation 2023    Lung nodule 2023    Medication side effect 2023    Neuritis 2023    OAB (overactive bladder) 2023    Systemic lupus erythematosus (Multi) 2023    Bowel habit changes 2023    Acute midline low back pain with right-sided sciatica 2023    Mixed hyperlipidemia 2023    Anterolisthesis of lumbar spine 2023    Right lumbar radiculopathy 2023    Sacroiliac pain 2023    Severe rt groin pain 2023    Trochanteric bursitis of right hip 2023    CAD (coronary artery disease) 2023    Discoid lupus erythematosus 2008    History of substance dependence (Multi) 2024     Resolved Ambulatory Problems     Diagnosis Date Noted    No Resolved Ambulatory Problems     Past Medical History:   Diagnosis Date    Encounter for gynecological examination (general) (routine) without abnormal findings 2019    Encounter for gynecological examination (general) (routine) without abnormal findings 2019    Panic disorder (episodic paroxysmal anxiety)     Personal history of other diseases of the nervous system and sense organs     Personal history of other specified conditions     Unspecified asthma, uncomplicated ([facility])        Family History   Problem Relation Name Age of Onset    Other (cardiac disorder) Mother      Lung cancer Mother  85    Other (cardiac disorder) Father         Past Surgical History:   Procedure Laterality Date    BACK SURGERY  10/22/2014    Back Surgery     SECTION, CLASSIC  2022     Section    OTHER SURGICAL HISTORY  06/10/2020    Tonsillectomy with adenoidectomy    OTHER SURGICAL HISTORY  06/10/2020    Foot surgery       Social History     Tobacco Use   Smoking Status Former    Current packs/day: 0.00     Average packs/day: 1 pack/day for 30.0 years (30.0 ttl pk-yrs)    Types: Cigarettes    Start date: 1987    Quit date: 2017    Years since quittin.3    Passive exposure: Past   Smokeless Tobacco Never       Allergies  Patient has no known allergies.    Current Meds  Current Outpatient Medications   Medication Instructions    amoxicillin-pot clavulanate (Augmentin) 875-125 mg tablet 875 mg, oral, 2 times daily    aspirin 81 mg, oral, Daily RT    atorvastatin (Lipitor) 40 mg tablet TAKE 1 TABLET BY MOUTH EVERY DAY    esomeprazole (NexIUM) 20 mg DR capsule TAKE 1 CAPSULE BY MOUTH ONCE DAILY BEFORE EATING    hydroxychloroquine (Plaquenil) 200 mg tablet TAKE 1& 1/2 TABLETS BY MOUTH DAILY    metoprolol succinate XL (TOPROL-XL) 50 mg, oral, Daily    sucralfate (CARAFATE) 1 g, oral, 2 times daily before meals    triamcinolone (Kenalog) 0.1 % cream APPLY AND RUB IN A THIN FILM TO AFFECTED AREAS TWICE DAILY.(AM AND PM).        Lab Results   Component Value Date    C3 115 2023    RF <10 02/10/2021    SEDRATE 4 2023    CRP 0.15 2022    URICACID 6.1 02/10/2021    DNADS <1.0 2023    CKTOTAL 177 02/10/2021        Manav Strauss MD

## 2024-05-08 ENCOUNTER — OFFICE VISIT (OUTPATIENT)
Dept: CARDIOLOGY | Facility: CLINIC | Age: 59
End: 2024-05-08
Payer: MEDICARE

## 2024-05-08 VITALS
DIASTOLIC BLOOD PRESSURE: 77 MMHG | HEIGHT: 64 IN | HEART RATE: 77 BPM | SYSTOLIC BLOOD PRESSURE: 129 MMHG | BODY MASS INDEX: 28.34 KG/M2 | WEIGHT: 166 LBS | TEMPERATURE: 98.1 F

## 2024-05-08 DIAGNOSIS — I10 PRIMARY HYPERTENSION: ICD-10-CM

## 2024-05-08 DIAGNOSIS — E78.2 MIXED HYPERLIPIDEMIA: ICD-10-CM

## 2024-05-08 DIAGNOSIS — I25.10 CORONARY ARTERY CALCIFICATION SEEN ON CT SCAN: Primary | ICD-10-CM

## 2024-05-08 DIAGNOSIS — I25.10 CORONARY ARTERY DISEASE INVOLVING NATIVE HEART WITHOUT ANGINA PECTORIS, UNSPECIFIED VESSEL OR LESION TYPE: ICD-10-CM

## 2024-05-08 PROCEDURE — 3078F DIAST BP <80 MM HG: CPT | Performed by: INTERNAL MEDICINE

## 2024-05-08 PROCEDURE — 99214 OFFICE O/P EST MOD 30 MIN: CPT | Performed by: INTERNAL MEDICINE

## 2024-05-08 PROCEDURE — 99204 OFFICE O/P NEW MOD 45 MIN: CPT | Performed by: INTERNAL MEDICINE

## 2024-05-08 PROCEDURE — 3074F SYST BP LT 130 MM HG: CPT | Performed by: INTERNAL MEDICINE

## 2024-05-08 PROCEDURE — 1036F TOBACCO NON-USER: CPT | Performed by: INTERNAL MEDICINE

## 2024-05-09 ENCOUNTER — OFFICE VISIT (OUTPATIENT)
Dept: OTOLARYNGOLOGY | Facility: CLINIC | Age: 59
End: 2024-05-09
Payer: MEDICARE

## 2024-05-09 ENCOUNTER — CLINICAL SUPPORT (OUTPATIENT)
Dept: AUDIOLOGY | Facility: CLINIC | Age: 59
End: 2024-05-09
Payer: MEDICARE

## 2024-05-09 VITALS — WEIGHT: 166 LBS | BODY MASS INDEX: 28.49 KG/M2

## 2024-05-09 DIAGNOSIS — Z01.818 PREOPERATIVE TESTING: ICD-10-CM

## 2024-05-09 DIAGNOSIS — H90.3 BILATERAL SENSORINEURAL HEARING LOSS: Primary | ICD-10-CM

## 2024-05-09 DIAGNOSIS — H90.3 SENSORINEURAL HEARING LOSS, BILATERAL: Primary | ICD-10-CM

## 2024-05-09 DIAGNOSIS — M32.9 SYSTEMIC LUPUS ERYTHEMATOSUS, UNSPECIFIED SLE TYPE, UNSPECIFIED ORGAN INVOLVEMENT STATUS (MULTI): ICD-10-CM

## 2024-05-09 DIAGNOSIS — H93.13 TINNITUS OF BOTH EARS: ICD-10-CM

## 2024-05-09 DIAGNOSIS — R26.89 BALANCE DISORDER: ICD-10-CM

## 2024-05-09 PROCEDURE — 1036F TOBACCO NON-USER: CPT | Performed by: OTOLARYNGOLOGY

## 2024-05-09 PROCEDURE — 99204 OFFICE O/P NEW MOD 45 MIN: CPT | Performed by: OTOLARYNGOLOGY

## 2024-05-09 PROCEDURE — 92557 COMPREHENSIVE HEARING TEST: CPT | Performed by: AUDIOLOGIST

## 2024-05-09 PROCEDURE — 92567 TYMPANOMETRY: CPT | Performed by: AUDIOLOGIST

## 2024-05-09 ASSESSMENT — PATIENT HEALTH QUESTIONNAIRE - PHQ9
SUM OF ALL RESPONSES TO PHQ9 QUESTIONS 1 AND 2: 0
1. LITTLE INTEREST OR PLEASURE IN DOING THINGS: NOT AT ALL
2. FEELING DOWN, DEPRESSED OR HOPELESS: NOT AT ALL

## 2024-05-09 NOTE — PROGRESS NOTES
"AUDIOMETRIC EVALUATION       Name:  Christa Christie  :  1965  Age:  58 y.o.  Date of Evaluation:  2024     HISTORY  Christa Christie was seen today for a hearing evaluation due to progressive sensorineural hearing loss. She reports interest in cochlear implants. She reports constant tinnitus and occasional lightheadedness. She has worn hearing aids for both ears and finds limited benefit. Her most recent hearing aids are [employer] NIRMAL which were purchased abut 3 years ago.    Denies vertigo, aural pain, drainage, fullness. Denies family history or noise exposure or otologic surgeries. History of sudden sensorineural hearing loss in 2016.    PROCEDURE:  Otoscopic Evaluation:    RIGHT: Clear ear canal and tympanic membrane visualized.  LEFT:  Clear ear canal and tympanic membrane visualized.    Immittance: Tympanometry (226 Hz probe tone) and Stapedial Acoustic Reflexes Thresholds (ART)(Probe ear):  RIGHT: Normal middle ear pressure, mobility, and ear canal volume. Ipsilateral ART absent 500-2000 Hz.  LEFT: Normal middle ear pressure, mobility, and ear canal volume. Ipsilateral ART absent 500-2000 Hz.    Pure Tone and Speech Audiometry:    Test Technique: Pure Tone Audiometry via TDH headphones  Test Reliability: good    RIGHT: Moderate hearing loss through 250 Hz sloping to profound  sensorineural hearing loss through 8000 Hz. Word Recognition score was very poor using recorded material (NU-6 25-word list).   LEFT:  Moderately severe hearing loss through 500 Hz sloping to profound  sensorineural hearing loss through 8000 Hz. Word Recognition score was very poor using recorded material (NU-6 25-word list).     EVALUATION  See scanned Audiogram in \"Media\".    IMPRESSIONS:  Today's test results indicate normal middle ear function, moderate to profound sensorineural hearing loss bilaterally with poor word recognition. Schedule CIE following appointment Corrina Pacheco MD " today.    RECOMMENDATIONS:  Continue medical follow-up with physician. Schedule CI evaluation.  Return for audiologic assessment in conjunction with otologic care or annually.   Implement communication strategies (utilizing visual cues/gestures; reducing background noise and distance from desired source; increasing light to assist with visual cues; use of clear speech).   Use of binaural hearing aids during all waking moments. Return to managing audiologist for earmold re-make, hearing aid checks, and programming as indicated.    PATIENT EDUCATION:   Discussed results and recommendations with Christa Christie.  Questions were addressed and the patient was encouraged to contact our department ([phone]) should concerns arise.    VANDANA Wu, CCC-A  Senior Clinical Audiologist    TIME: 200-230

## 2024-05-09 NOTE — PROGRESS NOTES
History of present illness:    Christa Christie is a 58 y.o. female is referred for possible cochlear implant for rehabilitation of progressive hearing loss. The patient is accompanied by her .   The approximate duration of the patient's hearing loss is 8 years. Progressive hearing loss has accelerated in the last three years. The patient currently is aided in both with limited benefit and appears to be motivated with realistic expectations.and  dysequilibrium also. She notes her better hearing ear is the left ear.  They  denies ear pain, discharge from ear, tinnitus, dizziness and vertigo. They also deny a history of prior ear surgery, noise exposure and family history of hearing loss. She endorses exposure to hydrochloroquine for SLE with significant benefit.    The patient's current medications, active allergies and list of medical problems were reviewed in the EHR and confirmed electronically there are as follows;    Allergies:   No Known Allergies  Current list of medications:   Current Outpatient Medications   Medication Sig Dispense Refill    aspirin 81 mg EC tablet Take 1 tablet (81 mg) by mouth once daily.      atorvastatin (Lipitor) 40 mg tablet TAKE 1 TABLET BY MOUTH EVERY DAY 90 tablet 1    esomeprazole (NexIUM) 20 mg DR capsule TAKE 1 CAPSULE BY MOUTH ONCE DAILY BEFORE EATING 90 capsule 3    hydroxychloroquine (Plaquenil) 200 mg tablet TAKE 1& 1/2 TABLETS BY MOUTH DAILY 135 tablet 0    metoprolol succinate XL (Toprol-XL) 50 mg 24 hr tablet TAKE 1 TABLET BY MOUTH EVERY DAY 90 tablet 1    sucralfate (Carafate) 1 gram tablet TAKE 1 TABLET (1 G) BY MOUTH 2 TIMES A DAY BEFORE MEALS. 180 tablet 0    triamcinolone (Kenalog) 0.1 % cream APPLY AND RUB IN A THIN FILM TO AFFECTED AREAS TWICE DAILY.(AM AND PM).       No current facility-administered medications for this visit.     Problem list:  Patient Active Problem List   Diagnosis    Anxiety    Bilateral asymmetric sensorineural hearing loss    Coronary  artery disease    Cervical spinal stenosis    Depression    Discoid lupus erythematosus of left eyelid    Discoid lupus erythematosus of right eyelid    GERD (gastroesophageal reflux disease)    Hallux rigidus of right foot    Hematuria, microscopic    Hypertension    Back pain with radiation    Lung nodule    Medication side effect    Neuritis    OAB (overactive bladder)    Systemic lupus erythematosus (Multi)    Bowel habit changes    Acute midline low back pain with right-sided sciatica    Mixed hyperlipidemia    Anterolisthesis of lumbar spine    Right lumbar radiculopathy    Sacroiliac pain    Severe rt groin pain    Trochanteric bursitis of right hip    Discoid lupus erythematosus    History of substance dependence (Multi)    Coronary artery disease involving native coronary artery of native heart without angina pectoris    Coronary artery calcification seen on CT scan     Medical history:  Past Medical History:   Diagnosis Date    Coronary artery calcification seen on CT scan 2024    Coronary artery disease involving native coronary artery of native heart without angina pectoris 2024    Severe coronary calcification on CT 3/19/24    Encounter for gynecological examination (general) (routine) without abnormal findings 2019    Pap test, as part of routine gynecological examination    Encounter for gynecological examination (general) (routine) without abnormal findings 2019    Pap test, as part of routine gynecological examination    Panic disorder (episodic paroxysmal anxiety)     Panic attack    Personal history of other diseases of the nervous system and sense organs     History of tinnitus    Personal history of other specified conditions     History of heartburn    Unspecified asthma, uncomplicated ([facility])     Asthmatic bronchitis     Surgical history:  Past Surgical History:   Procedure Laterality Date    BACK SURGERY  10/22/2014    Back Surgery     SECTION, CLASSIC   2022     Section    OTHER SURGICAL HISTORY  06/10/2020    Tonsillectomy with adenoidectomy    OTHER SURGICAL HISTORY  06/10/2020    Foot surgery     Family history:  Family History   Problem Relation Name Age of Onset    Other (cardiac disorder) Mother      Lung cancer Mother  85    Other (cardiac disorder) Father       Social history:  Social History     Tobacco Use    Smoking status: Former     Current packs/day: 0.00     Average packs/day: 1 pack/day for 30.0 years (30.0 ttl pk-yrs)     Types: Cigarettes     Start date: 1987     Quit date: 2017     Years since quittin.3     Passive exposure: Past    Smokeless tobacco: Never   Vaping Use    Vaping status: Never Used   Substance Use Topics    Alcohol use: Yes     Alcohol/week: 2.0 standard drinks of alcohol     Types: 2 Glasses of wine per week    Drug use: Never       Physical Examination:  CONSTITUTIONAL:  No acute distress  VOICE:  No hoarseness or other abnormality  RESPIRATION:  Breathing comfortably, no stridor  CV:  No clubbing/cyanosis/edema in hands  EYES:  EOM intact, sclera clear  NEURO:  Alert and oriented times 3, Cranial nerves II-XII grossly intact and symmetric bilaterally  HEAD AND FACE:  Symmetric facial features, no masses or lesions  RIGHT EAR:  Normal external ear and post auricular area, no visible lesions, external auditory canal patent, tympanic membrane intact, no retraction, no signs of mass, effusion, or infection within the middle ear  LEFT EAR: Normal external ear and post auricular area, no visible lesions, external auditory canal patent, tympanic membrane intact, no retraction, no signs of mass, effusion, or infection within the middle ear  NOSE:  Deferred due to Covid-19 pandemic.  ORAL CAVITY/OROPHARYNX/LIPS:  Deferred due to Covid-19 pandemic.  PHARYNGEAL WALLS: Deferred due to Covid-19 pandemic.  NECK/LYMPH:  No LAD, no thyroid masses, trachea midline  SKIN:  Neck and facial skin is without scar or  injury  PSYCH:  Alert and oriented with appropriate mood and affect    Diagnostic testing:    Audiometry:  Audiometry testing done on 05/09/24 reveals:  On the right: Moderate hearing loss sloping to profound sensorineural hearing loss. Speech discrimination is poor, 8%.  On the left:Moderately severe hearing loss sloping to profound sensorineural hearing loss. Speech discrimination is 36%.      I personally reviewed the available patient's external record and independently reviewed their audiometric testing  as detailed in my note and agree with the detailed report.      Impression:  bilateral Sensorineural hearing loss, possible ototoxic.    Recommendation:    We reviewed the audiogram together and the patient will likely meet the audiometric criteria for cochlear implantation. The patient appears to have realistic expectations and are motivated. The procedure was discussed and the risks reviewed included but not limited to bleeding, infection, device failure, dizziness, tinnitus, facial nerve injury and taste disturbance. The ear selected for implantation is the right ear. Will need to complete a CIE, HRCT, Cog evaluation.    Given the chronic disequilibrium, I would like to get a VNG and vHIT hit just to assess her vestibular function before proceeding with implementation.        Scribe Attestation  By signing my name below, ILaura, Scribe   attest that this documentation has been prepared under the direction and in the presence of Corrina Pacheco MD.

## 2024-05-10 ENCOUNTER — HOSPITAL ENCOUNTER (OUTPATIENT)
Dept: RADIOLOGY | Facility: CLINIC | Age: 59
Discharge: HOME | End: 2024-05-10
Payer: MEDICARE

## 2024-05-10 VITALS — HEIGHT: 64 IN | WEIGHT: 166 LBS | BODY MASS INDEX: 28.34 KG/M2

## 2024-05-10 DIAGNOSIS — Z12.31 ENCOUNTER FOR SCREENING MAMMOGRAM FOR MALIGNANT NEOPLASM OF BREAST: ICD-10-CM

## 2024-05-10 DIAGNOSIS — Z00.00 MEDICARE ANNUAL WELLNESS VISIT, SUBSEQUENT: ICD-10-CM

## 2024-05-10 PROCEDURE — 77067 SCR MAMMO BI INCL CAD: CPT

## 2024-05-10 PROCEDURE — 77067 SCR MAMMO BI INCL CAD: CPT | Performed by: STUDENT IN AN ORGANIZED HEALTH CARE EDUCATION/TRAINING PROGRAM

## 2024-05-10 PROCEDURE — 77063 BREAST TOMOSYNTHESIS BI: CPT | Performed by: STUDENT IN AN ORGANIZED HEALTH CARE EDUCATION/TRAINING PROGRAM

## 2024-05-13 DIAGNOSIS — H90.3 BILATERAL SENSORINEURAL HEARING LOSS: ICD-10-CM

## 2024-05-17 ENCOUNTER — APPOINTMENT (OUTPATIENT)
Dept: RHEUMATOLOGY | Facility: CLINIC | Age: 59
End: 2024-05-17
Payer: MEDICARE

## 2024-05-17 NOTE — PROGRESS NOTES
"ADULT BALANCE FUNCTION TEST (BFT)    Name:  Christa Christie  :  1965  Age:  58 y.o.  Date of Evaluation:  2024    IMPRESSIONS     Suspected bilateral peripheral vestibular involvement (right ear weaker) given the followin) overall low total SPV for both left and right ears for caloric irrigations, 2) reduced or absent VEMP responses in both left and right ears, and 3) persistent positional nystagmus. Evidence of high frequency residual function bilaterally given vHIT tracings.    The vestibular system appears to be compensated physiologically and uncompensated functionally. While today's evaluation is indicative of bilateral involvement, interpret results with caution as rotational chair testing is considered the \"gold standard\" for diagnosis of bilateral involvement.     There were no indications of central vestibular system pathway involvement. There were no indications of active Benign Paroxysmal Positional Vertigo (BPPV) present. Abnormal observation of gait and transfers given severe instability with need for structural support while walking down the hallways. Bedside postural testing was deferred on this date given observed instability.    RECOMMENDATIONS     Following cochlear implantation, consider vestibular physical therapy to address uncompensated bilateral vestibulopathy. Emphasis on sensory substitution exercises to account for inadequate vestibular input, gaze stabilization exercises for VOR deficiencies, habituation exercises to triggers, and fall risk prevention.  Consider re-evaluation as medically indicated.  Maintain a healthy lifestyle to help body function overall.  Continue monitoring per ENT/PCP preference.    Time: 9639-3609    HISTORY     Patient was seen for Balance Function Testing (BFT) due to a history of dizziness/imbalance. Vestibular case history collected via patient-clinician interview, patient chart review, and patient questionnaires.    Patient reported history of " "dizziness described as chronic imbalance.  Symptoms began years ago along with auditory symptoms.  Associated symptoms include veering to the right and intermittent brief lightheadedness.  Symptoms are provoked by no specific movements, simply walking in general.  Overall the patient's symptoms have increased in severity over time.  This patient has had a total of 0 falls due to their symptoms, but notes several stumbles.   Denied any symptoms provoked by sneezing or coughing, as well as any presence of autophony.   Relevant medical history includes previous back surgeries ([id number]) and Lupus.  Patient reported complying with pre-test instructions. Of note, reported she did not receive the instructions beforehand.    EVALUATION     See VNG, vHIT, & VEMP Raw Data in \"Media\"    TEST RESULTS     BEDSIDE ASSESSMENT TEST  The bedside assessment is an optional portion of the test battery to further assist in differential diagnosis and screen for eye abnormalities which may affect testing.    Otoscopy: clear ear canals.  Extra-Ocular Range of Motion: normal.Extra-Ocular Range of Motion is performed to evaluate any eye abnormalities prior to testing.  Cover/Uncover: normal. Cover/Uncover test is performed to evaluate for skewed deviation of the eyes prior to testing.  Cervical Neck Exam: normal. Cervical Neck is performed to evaluate any restrictions or pain with neck movement prior to testing.  Vertebral Artery Screen: normal. Vertebral Artery Screen is performed to evaluate for vertebrobasilar insufficiency prior to testing.   Ocular Counter-Roll: normal. Ocular Counter-Roll is performed to evaluate ocular tilt reaction and assess otolith organ function prior to testing.  VOR Cancellation: normal. VOR Cancellation is performed to evaluate fixation suppression prior to testing.  Modified Clinical Test of Sensory Interaction on Balance (mCTSIB): deferred on this date due to observed severe instability and balance. mCTSIB " is performed to evaluate balance with varying sensory information.      VIDEONYSTAGMOGRAPHY (VNG) TEST  VNG provides objective indications of peripheral and central vestibulo-ocular pathway involvement. Ocular motor testing to visually guided targets is conducted using a dual channel video-recording technique for the recording of eye movement in the horizontal and vertical planes. Air caloric testing is performed at 48 degrees C and 24 degrees C.    Spontaneous Nystagmus test was absent. Spontaneous nystagmus testing may help with the identification of an acute or uncompensated peripheral vestibular lesion.   Gaze Nystagmus test was normal. Gaze nystagmus testing is to evaluate for nystagmus that is evoked by holding eye gaze in any particular direction. True gaze nystagmus is amplified when vision is denied.   Random Saccades test was normal. Random saccade testing is to evaluate patient's ability to make fast random eye movements along a horizontal moving target.   Smooth Pursuit/Tracking test was normal. Smooth pursuit/tracking testing is to evaluate the ability to move eyes with a single smoothly moving target.   Optokinetic nystagmus testing was normal. This full-field OPK test is to evaluate the ability of central nervous system to stabilize vision during sustained head movement after the VOR system loses effectiveness.   Elliott-Hallpike testing was normal. Of note, persistent low velocity (4 d/s) left-beating nystagmus present in the head left position(s). Nystagmus reduced with fixation light. Patient did not report symptoms of dizziness. Not clinically significant or indicative of BPPV. Additionally, minimal torsional down-beating nystagmus brief reported symptoms in the head right position. Indicative of possible residual right anterior canalithiasis. Elliott-Hallpike testing is to provide a diagnosis of Benign Paroxysmal Positional Vertigo (BPPV) of the vertical semicircular canals on the side which is most  affected.  Roll testing was normal. Of note, persistent low velocity (2 d/s) left-beating nystagmus present in the head left position(s). Nystagmus reduced with fixation light. Patient did not report symptoms of dizziness. Not clinically significant or indicative of BPPV. Roll testing is to provide a diagnosis of Benign Paroxysmal Positional Vertigo (BPPV) of the horizontal semicircular canals on the side which is most affected.  Positional testing was abnormal given the presence of persistent low velocity (1-2 d/s) nystagmus present in the head left, head center, and pre-caloric position(s). Nystagmus reduced with fixation light. Patient did not report symptoms of dizziness. Given present in over 50% of positions tested - clinically significant finding. Positional testing is to evaluate patient's ability to hold a steady gaze while in different positions.  Bithermal caloric testing was abnormal. Overall low total SPV for both left and right irrigations (total LE: 12 d/s, total RE: 10 d/s). Indicative of bilateral vestibulopathy. Of note, caloric calculations corrected for pre-caloric nystagmus (1 d/s left-beating). Caloric testing is to evaluate for peripheral vestibular lesion.      VIDEO HEAD IMPULSE TEST (vHIT)  The vHIT procedure provides objective assessment of the high frequency vestibulo-ocular reflex (VOR) for each semicircular canal. Rapid, random horizontal and vertical thrusts are applied to the patient's head to provoke the VOR. The vHIT procedure includes two separate paradigms: Head Impulse Paradigm (HIMP) and Suppression Head Impulse Paradigm (SHIMP). SHIMP is an optional paradigm that is not appropriate to perform for every patient. However, it is appropriate to perform SHIMP when there is verified evidence of possible vestibulopathy in the traditional HIMP test.     Head Impulse Paradigm (HIMP)   Right Ear   Canal Gain Overt Saccades Covert Saccades   Lateral 1.03 absent absent   Anterior 0.92  absent absent   Posterior 0.70 absent absent        Head Impulse Paradigm (HIMP)   Left Ear   Canal Gain Overt Saccades Covert Saccades   Lateral 1.26 absent absent   Anterior 0.94 absent absent   Posterior 0.81 absent absent     Total gain for all canals tested was within normal limits  (<0.80 is abnormal for lateral, <0.70 is abnormal for vertical).  There was no evidence of overt or covert saccades throughout testing      CERVICAL VESTIBULAR EVOKED MYOGENIC POTENTIALS (cVEMP):  The cVEMP procedure is an evoked potential used to test the saccule and its afferent pathway. An asymmetry ratio is utilized to determine side of lesion. The cVEMP was recorded with the patient cervical extension to produce isolated contraction of the ipsilateral sternocleidomastoid (SCM) muscle. The cVEMP was recorded using a 500 Hz tone burst or 1000 Hz tone burst at a rate of 5.1.      Ear Presentation Level Amplitude P1 Latency N1 Latency  Amplitude Asymmetry Ratio   Right 110 dB nHL 49.70 µV 17.67 ms 24.00 ms Could not calculate due to threshold difference.   Left NR @ the limits of the equipment N/A N/A N/A       Replicable cVEMP responses were outside of normal limits in the in the right ear and absent in the left ear(s). The amplitude asymmetry ratio could not be calculated.     Superior Canal Dehiscence Screening (75 dB nHL): Negative bilaterally        OCULAR VESTIBULAR EVOKED MYOGENIC POTENTIALS (oVEMP)  The oVEMP procedure is an evoked potential used to test the utricle and its afferent pathway. An asymmetry ratio is utilized to determine side of lesion. This is a contralateral recording. The oVEMP was recorded with the patient seated upright with eyes tilted upward to produce isolated contraction of the contralateral inferior oblique muscle. The oVEMP were recorded using a 500 Hz, 2000 Hz, 4000 Hz tone burst at a rate of 5.1.       Ear Presentation Level Amplitude N1 Latency  P1 Latency  Amplitude Asymmetry Ratio   Right NR @  the limits of the equipment N/A N/A N/A Could not calculate due to threshold difference.   Left 110 dB nHL 6.77 µV 12.33 ms 15.00 ms       Replicable oVEMP responses were outside of normal limits in the left ear and absent in the right ear(s).  The amplitude asymmetry ratio could not be calculated.     Meniere's Disease Screening (2000 Hz): Negative in the left ear and positive in the right ear  Superior Canal Dehiscence Screening (4000 Hz): Negative bilaterally      Testing and interpretation of results completed by Geronimo Deutsch CCC-A CCJESSICA. It was my pleasure to evaluate this patient.       Geronimo Deutsch, CCC-A CCVR  Senior Clinical Vestibular Audiologist

## 2024-05-17 NOTE — PROGRESS NOTES
ADULT BALANCE FUNCTION TEST (BFT)      Name:  Christa Christie  :  1965  Age:  58 y.o.  Date of Evaluation:  2024    Patient's VEMP testing seen in conjunction with VNG & vHIT testing. Please refer to same day provider note for further details.      VANDANA Deutsch, CCC-A CCVR  Senior Clinical Vestibular Audiologist

## 2024-05-23 ENCOUNTER — APPOINTMENT (OUTPATIENT)
Dept: OTOLARYNGOLOGY | Facility: CLINIC | Age: 59
End: 2024-05-23
Payer: MEDICARE

## 2024-05-23 ENCOUNTER — APPOINTMENT (OUTPATIENT)
Dept: AUDIOLOGY | Facility: CLINIC | Age: 59
End: 2024-05-23
Payer: MEDICARE

## 2024-05-29 ENCOUNTER — APPOINTMENT (OUTPATIENT)
Dept: AUDIOLOGY | Facility: CLINIC | Age: 59
End: 2024-05-29
Payer: MEDICARE

## 2024-06-05 ENCOUNTER — HOSPITAL ENCOUNTER (OUTPATIENT)
Dept: RADIOLOGY | Facility: CLINIC | Age: 59
Discharge: HOME | End: 2024-06-05
Payer: MEDICARE

## 2024-06-05 ENCOUNTER — CLINICAL SUPPORT (OUTPATIENT)
Dept: AUDIOLOGY | Facility: CLINIC | Age: 59
End: 2024-06-05
Payer: MEDICARE

## 2024-06-05 DIAGNOSIS — H93.13 TINNITUS OF BOTH EARS: ICD-10-CM

## 2024-06-05 DIAGNOSIS — Z01.818 PREOPERATIVE TESTING: ICD-10-CM

## 2024-06-05 DIAGNOSIS — H90.3 SENSORINEURAL HEARING LOSS, BILATERAL: Primary | ICD-10-CM

## 2024-06-05 PROCEDURE — 70480 CT ORBIT/EAR/FOSSA W/O DYE: CPT

## 2024-06-05 PROCEDURE — 92626 EVAL AUD FUNCJ 1ST HOUR: CPT | Mod: 59 | Performed by: AUDIOLOGIST

## 2024-06-05 NOTE — PROGRESS NOTES
COCHLEAR IMPLANT EVALUATION         Name:  Christa Christie  :  1965  Age:  58 y.o.  Date of Evaluation:  2024     RIGHT DEVICE  Miracle Ear NIRMAL    LEFT DEVICE  Miracle Ear NIRMAL    HISTORY  Christa Christie arrives today for a cochlear implant evaluation due to known progressive moderate to profound sensorineural hearing loss, bilaterally. She reports constant tinnitus and occasional lightheadedness. She has worn hearing aids for both ears and finds limited benefit. Her most recent hearing aids are Miracle Ear NIRMAL which were purchased abut 3 years ago.     Denies aural fullness, pain, drainage, or history of ear surgeries. Reports hearing loss negatively impacts their quality of life.    UNAIDED TESTING:  Otoscopic Evaluation:    RIGHT: Clear ear canal and tympanic membrane visualized.  LEFT:  Clear ear canal and tympanic membrane visualized.    See unaided puretone and speech testing from 2024    FUNCTIONAL AIDED TESTING:  Testing completed with Phonak Nettie P90 UP BTE hearing aids programmed to documented hearing thresholds at 100% of NALN1.  Listening check of the hearing aids was adequate prior to testing. All testing was completed in the soundfield at 0 degrees azimuth. Pure tone testing was obtained using pulsed frequency modulated (FM) stimuli, and speech recognition threshold (SRT) was obtained using monitored live voice (MLV). Sentence and word recognition testing was performed with recorded material at 60 dBSPL.    R = Right HA only/left ear plugged  Aided thresholds obtained 20-75 dBHL for 250-6000 Hz.    L = Left HA only/right ear plugged  Aided thresholds obtained 30-85 dBHL for 250-6000 Hz.    LISTENING CONDITION CNC Words (60dBA) [employer] Sentences in Quiet (60dBA) [employer] Sentences in +10 SNR 4-talker babble (S0N0) (60dBA)   Right HA only 12% 17% 8%   Left HA only 8% 21% 14%     The Cochlear Implant Quality of Life-35 Profile (CIQOL-35 Profile) is a patient-reported outcome measure that was  "developed to assess the functional ability of adult cochlear users in 6 domains: Communication, Emotional, Entertainment, Environmental, Listening effort, and Social. The patient's CIQOL-10 global raw score is 21 and converted score is 34.03.     EVALUATION  See scanned Audiogram in \"Media\".    IMPRESSIONS  Christa Christie has a severe to profound sensorineural hearing loss bilaterally.  Due to the lack of benefit from appropriate amplification, the patient has expressed interest in a cochlear implant. An evaluation of the right ear and left ear with appropriately fit amplification revealed 8% and 14% performance on an open-set sentence recognition test, respectively.      The audiologic findings demonstrate that Christa Christie has partial residual hearing for tonal stimuli and speech detection with hearing aids, demonstrating that the auditory cranial nerve can be stimulated. Testing shows poor sentence and word understanding, even with appropriately programmed hearing aids indicating a severe to profound functional impairment.     Christa Christie has an inability to orient sounds in his environment and has even more difficulty understanding speech in noisy environments.  This is highly problematic for safety purposes and for communication. Thus, the nature of her deafness has dramatically and deleteriously affected her overall quality of life, localization, and communication. Not only bilateral severe to profound sensorineural is a FDA approved indication for cochlear implantation, but recent international consensus statement demonstrate the unquestionable benefits and need of cochlear implantation for these patients (Mercy CA, Matty RH, You DS, et al. Unilateral Cochlear Implants for Severe, Profound, or Moderate Sloping to Profound Bilateral Sensorineural Hearing Loss: A Systematic Review and Consensus Statements. [employer]OCKY Otolaryngol Head Neck Surg. 2020 Aug 27. doi: 10.1001).     On this basis, unaided " and aided testing indicates she is an audiologic candidate for a cochlear implant in the both ears. Christa Christie and the family have been counseled regarding the post-operative cochlear implant protocol and are properly motivated and able to participate in the post cochlear implant rehabilitation program. I, and the  CI team have determined that this surgery is medically necessary for the treatment of moderate to profound sensorineural hearing loss in both ears.     Christa Christie was given a complete CI packet with general information about CIs, information about follow-up and realistic expectations.  The patient reports understanding the time and effort it will take to adapt to the implant.  Christa Christie reports understanding of the spectrum of auditory outcomes that may be experience (i.e. detection to comprehension). Patient was provided with the realistic expectations, pneumococcal vaccination, and aural (re)habilitation forms to complete accordingly. Patient was advised that the research team may reach out to her to determine inclusion in potential studies.     This clinician is recommending bilateral cochlear implants, patient would like to move forward with her RIGHT ear first.    RECOMMENDATIONS  Follow up with one of our neuro-otologist/CI surgeons for CI consultation.  Return to audiology for additional counseling and device selection pending approval by the  CI team.  Continue use of binaural hearing aids during all waking moments. Return to managing audiologist for earmold re-make and programming as indicated.    VANDANA Wu, CCC-A  Senior Clinical Audiologist    TIME: 240-400

## 2024-06-06 ENCOUNTER — CLINICAL SUPPORT (OUTPATIENT)
Dept: AUDIOLOGY | Facility: CLINIC | Age: 59
End: 2024-06-06
Payer: MEDICARE

## 2024-06-06 DIAGNOSIS — R26.89 BALANCE DISORDER: Primary | ICD-10-CM

## 2024-06-06 DIAGNOSIS — R26.89 BALANCE DISORDER: ICD-10-CM

## 2024-06-06 PROCEDURE — 92537 CALORIC VSTBLR TEST W/REC: CPT

## 2024-06-06 PROCEDURE — 92700 UNLISTED ORL SERVICE/PX: CPT

## 2024-06-06 PROCEDURE — 92540 BASIC VESTIBULAR EVALUATION: CPT

## 2024-06-06 PROCEDURE — 92519 VEMP TST I&R CERVICAL&OCULAR: CPT

## 2024-06-06 NOTE — PATIENT INSTRUCTIONS
BALANCE FUNCTION TEST (BFT)  AFTER VISIT SUMMARY      TESTING SUMMARY     The purpose of today's testing was to evaluate for any vestibular system (inner ear) involvement to account for your symptoms of dizziness/imbalance. Deep inside each of your ears, there are 5 balance organs which contribute to your ability to maintain balance and reduce dizziness. Our vestibular system involves 3 semicircular canals (“spinning detectors”) and 2 otolith organs (“gravity sensors”).    IMPRESSIONS     Based on today's evaluation, your vestibular system appears to be weakened on both sides (right ear worse) and possibly contributing as a source for your symptoms.    RECOMMENDATIONS     Continue medical follow up with Corrina Pacheco MD.   Consider further vestibular physical therapy to address vestibular loss following cochlear implantation.   Consider re-evaluation as medically indicated.  Maintain a healthy lifestyle to help body function overall.    Testing and interpretation of results completed by Geronimo Deutsch CCC-A CC. It was my pleasure to evaluate this patient.       Geronimo Deutsch, CCC-A CCVR  Senior Clinical Vestibular Audiologist

## 2024-06-13 ENCOUNTER — TELEPHONE (OUTPATIENT)
Dept: CARDIOLOGY | Facility: CLINIC | Age: 59
End: 2024-06-13
Payer: MEDICARE

## 2024-06-17 DIAGNOSIS — H90.3 SENSORY HEARING LOSS, BILATERAL: ICD-10-CM

## 2024-06-18 ENCOUNTER — HOSPITAL ENCOUNTER (OUTPATIENT)
Dept: CARDIOLOGY | Facility: CLINIC | Age: 59
Discharge: HOME | End: 2024-06-18
Payer: MEDICARE

## 2024-06-18 VITALS
SYSTOLIC BLOOD PRESSURE: 147 MMHG | DIASTOLIC BLOOD PRESSURE: 79 MMHG | HEIGHT: 64 IN | BODY MASS INDEX: 28.34 KG/M2 | HEART RATE: 75 BPM | WEIGHT: 166 LBS

## 2024-06-18 DIAGNOSIS — I25.10 CORONARY ARTERY DISEASE INVOLVING NATIVE HEART WITHOUT ANGINA PECTORIS, UNSPECIFIED VESSEL OR LESION TYPE: ICD-10-CM

## 2024-06-18 PROCEDURE — 93350 STRESS TTE ONLY: CPT | Performed by: INTERNAL MEDICINE

## 2024-06-18 PROCEDURE — 93017 CV STRESS TEST TRACING ONLY: CPT

## 2024-06-18 PROCEDURE — 93016 CV STRESS TEST SUPVJ ONLY: CPT | Performed by: INTERNAL MEDICINE

## 2024-06-18 PROCEDURE — 93018 CV STRESS TEST I&R ONLY: CPT | Performed by: INTERNAL MEDICINE

## 2024-06-20 ENCOUNTER — APPOINTMENT (OUTPATIENT)
Dept: SPEECH THERAPY | Facility: HOSPITAL | Age: 59
End: 2024-06-20
Payer: MEDICARE

## 2024-06-24 ENCOUNTER — EVALUATION (OUTPATIENT)
Dept: SPEECH THERAPY | Facility: CLINIC | Age: 59
End: 2024-06-24
Payer: MEDICARE

## 2024-06-24 DIAGNOSIS — H90.3 SENSORY HEARING LOSS, BILATERAL: Primary | ICD-10-CM

## 2024-06-24 DIAGNOSIS — R48.8 OTHER SYMBOLIC DYSFUNCTIONS: ICD-10-CM

## 2024-06-24 DIAGNOSIS — H90.3 BILATERAL SENSORINEURAL HEARING LOSS: ICD-10-CM

## 2024-06-24 DIAGNOSIS — R41.841 COGNITIVE COMMUNICATION DEFICIT: ICD-10-CM

## 2024-06-24 PROCEDURE — 92523 SPEECH SOUND LANG COMPREHEN: CPT | Mod: GN

## 2024-06-24 ASSESSMENT — ENCOUNTER SYMPTOMS
LOSS OF SENSATION IN FEET: 0
OCCASIONAL FEELINGS OF UNSTEADINESS: 1
DEPRESSION: 0

## 2024-06-24 ASSESSMENT — PAIN - FUNCTIONAL ASSESSMENT: PAIN_FUNCTIONAL_ASSESSMENT: 0-10

## 2024-06-24 ASSESSMENT — PAIN SCALES - GENERAL: PAINLEVEL_OUTOF10: 0 - NO PAIN

## 2024-06-24 NOTE — PROGRESS NOTES
Speech-Language Pathology    Auditory Evaluation      Patient Name: Christa Christie  MRN: [id number]  Today's Date: 6/24/2024  Time Calculation  Start Time: 1309  Stop Time: 1430  Time Calculation (min): 81 min         Current Problem:  Patient Active Problem List   Diagnosis    Anxiety    Bilateral asymmetric sensorineural hearing loss    Coronary artery disease    Cervical spinal stenosis    Depression    Discoid lupus erythematosus of left eyelid    Discoid lupus erythematosus of right eyelid    GERD (gastroesophageal reflux disease)    Hallux rigidus of right foot    Hematuria, microscopic    Hypertension    Back pain with radiation    Lung nodule    Medication side effect    Neuritis    OAB (overactive bladder)    Systemic lupus erythematosus (Multi)    Bowel habit changes    Acute midline low back pain with right-sided sciatica    Mixed hyperlipidemia    Anterolisthesis of lumbar spine    Right lumbar radiculopathy    Sacroiliac pain    Severe rt groin pain    Trochanteric bursitis of right hip    Discoid lupus erythematosus    History of substance dependence (Multi)    Coronary artery disease involving native coronary artery of native heart without angina pectoris    Coronary artery calcification seen on CT scan    Sensory hearing loss, bilateral    Cognitive communication deficit    Other symbolic dysfunctions          Recommendations and Impressions:  NORMAL Cognitive assessment with normal composite severity rating.  Some mild deficits in Memory for age, though all individual task scores were above the cutoff scores/within normal limits.    Recommendations: Aural Rehab 1-4 weeks S/P Cochlear Implant Activation    Provider:  Audiology: Dr. Gonzalez  ENT: Dr. Pacheco    Pain:  Pain Assessment  Pain Assessment: 0-10  0-10 (Numeric) Pain Score: 0 - No pain    Auditory Plan of Care:  Recommend Treatment: No  Discussed POC: Patient  The patient's family/caregiver was educated regarding appropriate motivation and  expectations for a cochlear implant (CI) and the CI process.: Yes    General Visit Information:  Recommendations: Aural Rehab 1-4 weeks S/P Cochlear Implant Activation  Living Environment: Home  Language at home: Spoken English  Arrival: Independent  Reason for Referral: Cognitive assessment as part of cochlear implant candidacy determination  Certification Period Start Date: 06/24/24  Certification Period End Date: 09/24/24  Prior Level of Function: WFL    Baseline Observations:  Hearing Loss: Progressive (Over time)  Duration of Hearing Loss: 20+ years  Etiology of hearing loss: unknown  Current Amplification: Worn during evaluation  Left Ear: Hearing Aid  Right Ear: Hearing Aid  Amplification Worn During Evaluation: Yes  Hours Worn: Whenever awake    Resonance-Voice Assessment:  Assessments Used: Informal  Articulation Screening: Age appropriate  Nasal Resonance: Normal  Nasal Air Emissions: Not present  Voice: Normal  Speech Inteligibility: 100%    Patient Subjective Assessment:   Patient considering a cochlear implant in July 2024.  Patient with sudden bilateral sensorineural hearing loss in 2016.  Has hearing aids with limited benefit.  RIGHT: Moderate hearing loss through 250 Hz sloping to profound  sensorineural hearing loss through 8000 Hz. LEFT:    Moderately severe hearing loss through 500 Hz sloping to profound  sensorineural hearing loss through 8000 Hz.       Cognition Skill Assessment:  CLQT: Personal Facts Score: 8  CLQT: Personal Facts Function: [facility]  CLQT: Symbol Cancellation Score: 12  CLQT: Symbol Cancellation Function: [facility]  CLQT: Confrontational Naming Score: 10  CLQT: Confrontational Naming Function: [facility]  CLQT: Clock Drawing Score: 13  CLQT: Clock Drawing Function: [facility]  CLQT: Story Retelling Score: 6  CLQT: Story Retelling Function: [facility]  CLQT: Symbol Trails Score: 9  CLQT: Symbol Trails Function: [facility]  CLQT: Generative Naming Score: 6  CLQT: Generative Naming Function: [facility]  CLQT: Design Memory  Score: 5  CLQT: Design Memory Function: WFL  CLQT: Mazes Score: 8  CLQT: Mazes Function: WFL  CLQT: Design Generation Score: 11  CLQT: Design Generation Function: WFL  CLQT: Auditory Comprehension Score: 19  CLQT: Auditory Comprehension Function: WFL    SRCD - Severity Rating Cognitive Domains:  SRCD: Attention Score: 200  SRCD: Attention Ratin  SRCD: Attention Function: WFL  SRCD: Memory Score: 148  SRCD: Memory Rating: 3  SRCD: Memory Function: Mild  SRCD: Executive Functions Score: 34  SRCD: Executive Functions Ratin  SRCD: Executive Functions Function: WFL  SRCD: Language Score: 30  SRCD: Language Ratin  SRCD: Language Function: WFL  SRCD: Visuospatial Skills Score: 97  SRCD: Visuospatial Skills Ratin  SRCD:Visuospatial Skills Function: WFL  SRCD: Non-Linguistic Cognition Score: 45  SRCD: Non-Linguistic Cognition Ratin  SRCD: Non-Linguistic Cognition Function: WFL  SRCD: Linguistic Cognition Score: 49  SRCD: Linguistic Cognition Ratin  SRCD: Linguistic Cognition Function: WFL  SRCD: Clock Drawing Severity Rating Score: 13  SRCD: Clock Drawing Severity Rating Rate: 4  SRCD: Clock Drawing Severity Rating Function: WFL  SRCD: Generative Naming Perseveration Ratio Score: 0.00  SRCD: Generative Naming Perseveration Ratio Function: WFL  SRCD: Composite Severity Score Rating Function: 3.8  SRCD: Composite Severity Score Rating Function: WFL    Auditory Education:  Treatment Performed Today: No  Individual(s) Educated: Patient  Verbal Education Provided: Results of Testing  Response to Educaton: Verbalized Understanding  Patient's Learning Preference(s): Explanation/Discussion  Patient/caregiver verbalized understanding and agreement.: Yes    Education provided regarding aural rehabilitation s/p cochlear implantation (approximately 1 month after initial stimulation).  Educated regarding course of aural rehabilitation and appropriate motivation and expectations for cochlear implants.  Answered  patient's questions and provided contact information if additional questions are raised.

## 2024-06-28 ENCOUNTER — LAB (OUTPATIENT)
Dept: LAB | Facility: LAB | Age: 59
End: 2024-06-28
Payer: MEDICARE

## 2024-06-28 DIAGNOSIS — Z01.818 PREOPERATIVE TESTING: ICD-10-CM

## 2024-06-28 LAB
ANION GAP SERPL CALC-SCNC: 12 MMOL/L (ref 10–20)
BUN SERPL-MCNC: 18 MG/DL (ref 6–23)
CALCIUM SERPL-MCNC: 9.7 MG/DL (ref 8.6–10.3)
CHLORIDE SERPL-SCNC: 103 MMOL/L (ref 98–107)
CO2 SERPL-SCNC: 31 MMOL/L (ref 21–32)
CREAT SERPL-MCNC: 0.85 MG/DL (ref 0.5–1.05)
EGFRCR SERPLBLD CKD-EPI 2021: 79 ML/MIN/1.73M*2
ERYTHROCYTE [DISTWIDTH] IN BLOOD BY AUTOMATED COUNT: 11.8 % (ref 11.5–14.5)
GLUCOSE SERPL-MCNC: 97 MG/DL (ref 74–99)
HCT VFR BLD AUTO: 40.3 % (ref 36–46)
HGB BLD-MCNC: 13.3 G/DL (ref 12–16)
MCH RBC QN AUTO: 34 PG (ref 26–34)
MCHC RBC AUTO-ENTMCNC: 33 G/DL (ref 32–36)
MCV RBC AUTO: 103 FL (ref 80–100)
NRBC BLD-RTO: 0.5 /100 WBCS (ref 0–0)
PLATELET # BLD AUTO: 184 X10*3/UL (ref 150–450)
POTASSIUM SERPL-SCNC: 4.3 MMOL/L (ref 3.5–5.3)
RBC # BLD AUTO: 3.91 X10*6/UL (ref 4–5.2)
SODIUM SERPL-SCNC: 142 MMOL/L (ref 136–145)
WBC # BLD AUTO: 4.2 X10*3/UL (ref 4.4–11.3)

## 2024-06-28 PROCEDURE — 85027 COMPLETE CBC AUTOMATED: CPT

## 2024-06-28 PROCEDURE — 80048 BASIC METABOLIC PNL TOTAL CA: CPT

## 2024-06-28 PROCEDURE — 36415 COLL VENOUS BLD VENIPUNCTURE: CPT

## 2024-07-05 ENCOUNTER — CLINICAL SUPPORT (OUTPATIENT)
Dept: AUDIOLOGY | Facility: CLINIC | Age: 59
End: 2024-07-05
Payer: MEDICARE

## 2024-07-05 DIAGNOSIS — H90.3 SENSORINEURAL HEARING LOSS, BILATERAL: Primary | ICD-10-CM

## 2024-07-05 PROCEDURE — V5299 HEARING SERVICE: HCPCS | Mod: AUDSP | Performed by: AUDIOLOGIST

## 2024-07-05 NOTE — PROGRESS NOTES
"COCHLEAR IMPLANT (CI) DEVICE SELECTION     Name:  Christa Christie  :  1965  Age:  58 y.o.  Date of Evaluation:  2024     HISTORY  Christa Christie arrives today for a cochlear implant device selection - which most of this was completed at her CI evaluation. She has a known progressive moderate to profound sensorineural hearing loss, bilaterally. She reports constant tinnitus and occasional lightheadedness. She has worn hearing aids for both ears and finds limited benefit. Her most recent hearing aids are [employer] NIRMAL which were purchased abut 3 years ago.      Denies aural fullness, pain, drainage, or history of ear surgeries. Reports hearing loss negatively impacts their quality of life.    PROCEDURE  Today's 15-minute appointment was spent discussing the cochlear implant manufacturers, the devices and associated accessories. Advantages of each company were discussing.  Discussed reasonable expectations as well as spectrum of auditory outcomes and need for auditory training following implantation. Following the discussion and answering any remaining questions regarding the CI process, the patient decided to order:    Confirmed order: [employer] N8 sound processor in Black with 2 standard rechargeable batteries, 2\" cable and 2I and 3I magnet, Phone Clip, Plus One, Mini Raphael and disposable batteries. Order form was submitted on  with a TV streamer ordered, patient would like to replace that with MiniMIc - will do an exchange at the activation.    RECOMMENDATIONS  1. Continue medical follow-up with Corrina Pacheco MD.  2. Return for cochlear implant activation pending post-operative medical clearance.  3. Continues use of binaural hearing aids during all waking moments.    TIME: 8417-5043  "

## 2024-07-09 ENCOUNTER — TELEPHONE (OUTPATIENT)
Dept: RHEUMATOLOGY | Facility: CLINIC | Age: 59
End: 2024-07-09
Payer: MEDICARE

## 2024-07-09 DIAGNOSIS — M32.9 SYSTEMIC LUPUS ERYTHEMATOSUS, UNSPECIFIED (MULTI): ICD-10-CM

## 2024-07-09 NOTE — TELEPHONE ENCOUNTER
Pt called in regards for refill on   hydroxychloroquine (Plaquenil) 200 mg tablet     Pharmacy [facility]

## 2024-07-10 DIAGNOSIS — R19.4 BOWEL HABIT CHANGES: ICD-10-CM

## 2024-07-10 RX ORDER — HYDROXYCHLOROQUINE SULFATE 200 MG/1
TABLET, FILM COATED ORAL
Qty: 135 TABLET | Refills: 0 | Status: SHIPPED | OUTPATIENT
Start: 2024-07-10

## 2024-07-10 RX ORDER — SUCRALFATE 1 G/1
1 TABLET ORAL
Qty: 180 TABLET | Refills: 0 | Status: SHIPPED | OUTPATIENT
Start: 2024-07-10 | End: 2025-07-10

## 2024-07-17 ENCOUNTER — ANESTHESIA EVENT (OUTPATIENT)
Dept: OPERATING ROOM | Facility: CLINIC | Age: 59
End: 2024-07-17
Payer: MEDICARE

## 2024-07-18 ENCOUNTER — ANESTHESIA (OUTPATIENT)
Dept: OPERATING ROOM | Facility: CLINIC | Age: 59
End: 2024-07-18
Payer: MEDICARE

## 2024-07-18 ENCOUNTER — HOSPITAL ENCOUNTER (OUTPATIENT)
Facility: CLINIC | Age: 59
Setting detail: OUTPATIENT SURGERY
Discharge: HOME | End: 2024-07-18
Attending: OTOLARYNGOLOGY | Admitting: OTOLARYNGOLOGY
Payer: MEDICARE

## 2024-07-18 VITALS
SYSTOLIC BLOOD PRESSURE: 114 MMHG | HEART RATE: 63 BPM | RESPIRATION RATE: 16 BRPM | DIASTOLIC BLOOD PRESSURE: 57 MMHG | BODY MASS INDEX: 28.53 KG/M2 | WEIGHT: 166.23 LBS | OXYGEN SATURATION: 97 % | TEMPERATURE: 98.4 F

## 2024-07-18 DIAGNOSIS — G89.18 POSTOPERATIVE PAIN: ICD-10-CM

## 2024-07-18 DIAGNOSIS — H90.3 SENSORY HEARING LOSS, BILATERAL: Primary | ICD-10-CM

## 2024-07-18 PROCEDURE — 3700000002 HC GENERAL ANESTHESIA TIME - EACH INCREMENTAL 1 MINUTE: Performed by: OTOLARYNGOLOGY

## 2024-07-18 PROCEDURE — 7100000010 HC PHASE TWO TIME - EACH INCREMENTAL 1 MINUTE: Performed by: OTOLARYNGOLOGY

## 2024-07-18 PROCEDURE — 7100000009 HC PHASE TWO TIME - INITIAL BASE CHARGE: Performed by: OTOLARYNGOLOGY

## 2024-07-18 PROCEDURE — 2500000004 HC RX 250 GENERAL PHARMACY W/ HCPCS (ALT 636 FOR OP/ED): Performed by: ANESTHESIOLOGIST ASSISTANT

## 2024-07-18 PROCEDURE — L8614 COCHLEAR DEVICE: HCPCS | Performed by: OTOLARYNGOLOGY

## 2024-07-18 PROCEDURE — 3600000009 HC OR TIME - EACH INCREMENTAL 1 MINUTE - PROCEDURE LEVEL FOUR: Performed by: OTOLARYNGOLOGY

## 2024-07-18 PROCEDURE — 3700000001 HC GENERAL ANESTHESIA TIME - INITIAL BASE CHARGE: Performed by: OTOLARYNGOLOGY

## 2024-07-18 PROCEDURE — 7100000001 HC RECOVERY ROOM TIME - INITIAL BASE CHARGE: Performed by: OTOLARYNGOLOGY

## 2024-07-18 PROCEDURE — 3600000004 HC OR TIME - INITIAL BASE CHARGE - PROCEDURE LEVEL FOUR: Performed by: OTOLARYNGOLOGY

## 2024-07-18 PROCEDURE — 69930 IMPLANT COCHLEAR DEVICE: CPT | Performed by: OTOLARYNGOLOGY

## 2024-07-18 PROCEDURE — 2780000003 HC OR 278 NO HCPCS: Performed by: OTOLARYNGOLOGY

## 2024-07-18 PROCEDURE — 2720000007 HC OR 272 NO HCPCS: Performed by: OTOLARYNGOLOGY

## 2024-07-18 PROCEDURE — 2500000005 HC RX 250 GENERAL PHARMACY W/O HCPCS: Performed by: OTOLARYNGOLOGY

## 2024-07-18 PROCEDURE — 2500000002 HC RX 250 W HCPCS SELF ADMINISTERED DRUGS (ALT 637 FOR MEDICARE OP, ALT 636 FOR OP/ED): Performed by: ANESTHESIOLOGIST ASSISTANT

## 2024-07-18 PROCEDURE — 2500000001 HC RX 250 WO HCPCS SELF ADMINISTERED DRUGS (ALT 637 FOR MEDICARE OP): Performed by: ANESTHESIOLOGIST ASSISTANT

## 2024-07-18 PROCEDURE — 7100000002 HC RECOVERY ROOM TIME - EACH INCREMENTAL 1 MINUTE: Performed by: OTOLARYNGOLOGY

## 2024-07-18 DEVICE — IMPLANTABLE DEVICE
Type: IMPLANTABLE DEVICE | Site: EAR | Status: FUNCTIONAL
Brand: COCHLEAR™ NUCLEUS® CI632 COCHLEAR IMPLANT WITH SLIM MODIOLAR ELECTRODE

## 2024-07-18 RX ORDER — HYDROMORPHONE HYDROCHLORIDE 0.2 MG/ML
0.2 INJECTION INTRAMUSCULAR; INTRAVENOUS; SUBCUTANEOUS EVERY 5 MIN PRN
Status: DISCONTINUED | OUTPATIENT
Start: 2024-07-18 | End: 2024-07-18 | Stop reason: HOSPADM

## 2024-07-18 RX ORDER — TRAMADOL HYDROCHLORIDE 50 MG/1
50 TABLET ORAL EVERY 6 HOURS PRN
Qty: 12 TABLET | Refills: 0 | Status: SHIPPED | OUTPATIENT
Start: 2024-07-18 | End: 2024-07-21

## 2024-07-18 RX ORDER — PROPOFOL 10 MG/ML
INJECTION, EMULSION INTRAVENOUS AS NEEDED
Status: DISCONTINUED | OUTPATIENT
Start: 2024-07-18 | End: 2024-07-18

## 2024-07-18 RX ORDER — ONDANSETRON HYDROCHLORIDE 2 MG/ML
4 INJECTION, SOLUTION INTRAVENOUS ONCE AS NEEDED
Status: DISCONTINUED | OUTPATIENT
Start: 2024-07-18 | End: 2024-07-18 | Stop reason: HOSPADM

## 2024-07-18 RX ORDER — ALBUTEROL SULFATE 0.83 MG/ML
2.5 SOLUTION RESPIRATORY (INHALATION) ONCE AS NEEDED
Status: DISCONTINUED | OUTPATIENT
Start: 2024-07-18 | End: 2024-07-18 | Stop reason: HOSPADM

## 2024-07-18 RX ORDER — GABAPENTIN 300 MG/1
CAPSULE ORAL AS NEEDED
Status: DISCONTINUED | OUTPATIENT
Start: 2024-07-18 | End: 2024-07-18

## 2024-07-18 RX ORDER — SODIUM CHLORIDE 0.9 G/100ML
IRRIGANT IRRIGATION AS NEEDED
Status: DISCONTINUED | OUTPATIENT
Start: 2024-07-18 | End: 2024-07-18 | Stop reason: HOSPADM

## 2024-07-18 RX ORDER — METOCLOPRAMIDE HYDROCHLORIDE 5 MG/ML
10 INJECTION INTRAMUSCULAR; INTRAVENOUS ONCE AS NEEDED
Status: DISCONTINUED | OUTPATIENT
Start: 2024-07-18 | End: 2024-07-18 | Stop reason: HOSPADM

## 2024-07-18 RX ORDER — APREPITANT 40 MG/1
CAPSULE ORAL AS NEEDED
Status: DISCONTINUED | OUTPATIENT
Start: 2024-07-18 | End: 2024-07-18

## 2024-07-18 RX ORDER — POLYMYXIN B 500000 [USP'U]/1
INJECTION, POWDER, LYOPHILIZED, FOR SOLUTION INTRAMUSCULAR; INTRATHECAL; INTRAVENOUS; OPHTHALMIC AS NEEDED
Status: DISCONTINUED | OUTPATIENT
Start: 2024-07-18 | End: 2024-07-18 | Stop reason: HOSPADM

## 2024-07-18 RX ORDER — FENTANYL CITRATE 50 UG/ML
INJECTION, SOLUTION INTRAMUSCULAR; INTRAVENOUS AS NEEDED
Status: DISCONTINUED | OUTPATIENT
Start: 2024-07-18 | End: 2024-07-18

## 2024-07-18 RX ORDER — PHENYLEPHRINE HYDROCHLORIDE 10 MG/ML
INJECTION INTRAVENOUS AS NEEDED
Status: DISCONTINUED | OUTPATIENT
Start: 2024-07-18 | End: 2024-07-18

## 2024-07-18 RX ORDER — CEFAZOLIN 1 G/1
INJECTION, POWDER, FOR SOLUTION INTRAVENOUS AS NEEDED
Status: DISCONTINUED | OUTPATIENT
Start: 2024-07-18 | End: 2024-07-18

## 2024-07-18 RX ORDER — ONDANSETRON HYDROCHLORIDE 2 MG/ML
INJECTION, SOLUTION INTRAVENOUS AS NEEDED
Status: DISCONTINUED | OUTPATIENT
Start: 2024-07-18 | End: 2024-07-18

## 2024-07-18 RX ORDER — LIDOCAINE HYDROCHLORIDE AND EPINEPHRINE 10; 10 MG/ML; UG/ML
INJECTION, SOLUTION INFILTRATION; PERINEURAL AS NEEDED
Status: DISCONTINUED | OUTPATIENT
Start: 2024-07-18 | End: 2024-07-18 | Stop reason: HOSPADM

## 2024-07-18 RX ORDER — LIDOCAINE IN NACL,ISO-OSMOT/PF 30 MG/3 ML
0.1 SYRINGE (ML) INJECTION ONCE
Status: DISCONTINUED | OUTPATIENT
Start: 2024-07-18 | End: 2024-07-18 | Stop reason: HOSPADM

## 2024-07-18 RX ORDER — CEPHALEXIN 500 MG/1
500 CAPSULE ORAL 3 TIMES DAILY
Qty: 15 CAPSULE | Refills: 0 | Status: SHIPPED | OUTPATIENT
Start: 2024-07-18 | End: 2024-07-23

## 2024-07-18 RX ORDER — SODIUM CHLORIDE, SODIUM LACTATE, POTASSIUM CHLORIDE, CALCIUM CHLORIDE 600; 310; 30; 20 MG/100ML; MG/100ML; MG/100ML; MG/100ML
100 INJECTION, SOLUTION INTRAVENOUS CONTINUOUS
Status: DISCONTINUED | OUTPATIENT
Start: 2024-07-18 | End: 2024-07-18 | Stop reason: HOSPADM

## 2024-07-18 RX ORDER — OXYCODONE HYDROCHLORIDE 5 MG/1
5 TABLET ORAL EVERY 4 HOURS PRN
Status: DISCONTINUED | OUTPATIENT
Start: 2024-07-18 | End: 2024-07-18 | Stop reason: HOSPADM

## 2024-07-18 RX ORDER — MIDAZOLAM HYDROCHLORIDE 1 MG/ML
INJECTION, SOLUTION INTRAMUSCULAR; INTRAVENOUS AS NEEDED
Status: DISCONTINUED | OUTPATIENT
Start: 2024-07-18 | End: 2024-07-18

## 2024-07-18 RX ORDER — ACETAMINOPHEN 325 MG/1
TABLET ORAL AS NEEDED
Status: DISCONTINUED | OUTPATIENT
Start: 2024-07-18 | End: 2024-07-18

## 2024-07-18 SDOH — HEALTH STABILITY: MENTAL HEALTH: CURRENT SMOKER: 0

## 2024-07-18 ASSESSMENT — PAIN - FUNCTIONAL ASSESSMENT
PAIN_FUNCTIONAL_ASSESSMENT: 0-10

## 2024-07-18 ASSESSMENT — PAIN SCALES - GENERAL
PAINLEVEL_OUTOF10: 0 - NO PAIN

## 2024-07-18 NOTE — DISCHARGE INSTRUCTIONS
Postoperative Care Instructions:  Cochlear Implantation    Postoperative Care:    24-hours after surgery, remove the large dressing from around your head. Remove the gauzes and the shiny C shaped gauze. You can shower three days after surgery, but do not completely submerge your ear under water until cleared to do so by Dr. Pacheco.    You will notice some sticky strips behind your ear - these are called “Steri-strips,” and should remain in place until your follow up appointment.  They may begin to curl up on the edges, but do not pull them off on your own.  Observe the incision for any increasing redness, swelling, pain, or foul-smelling drainage.  If you have any of these symptoms, please call Dr. Pacheco's office immediately.    Avoid blowing your nose, lifting objects heavier than a jug of milk, or straining for any reason until your follow-up appointment.   Sneeze with your mouth open.  Sleep with your head elevated for two days after surgery.  Avoid any airplane travel until your follow up appointment.  Take your antibiotics as prescribed for seven days after surgery, and take your pain medications only as prescribed and only as needed for moderate to severe pain.    Please call Dr. Pacheco's office at [phone] as soon as possible to make a follow up appointment in 3-5 weeks.    What to Expect Following Surgery:    The following are some symptoms that may follow your recent ear surgery:    Hearing - Although your cochlear implant has not yet been permanently activated, it was tested in the operating room while you were still asleep to make sure it was in position and working appropriately.  You must call the [facility] Department of Audiology to make an appointment to have your cochlear implant activated in three to four weeks.   Please call as soon as possible after surgery, as these appointments fill very quickly.    Taste disturbance and mouth dryness - These symptoms may occur for a few  weeks following ear surgery, and are usually temporary, but can be prolonged in rare cases.   Taste disturbance usually presents as a metallic taste in the mouth, but can come in other forms as well.    Tinnitus - Tinnitus (head noise or ringing in the ears), frequently present before surgery, is very common after surgery, but is usually temporary.  This can persist for one to two months and then may decrease in intensity as your hearing improves.     Jaw symptoms - The front wall of the ear canal is the same as the back wall of the jaw joint, and therefore some temporary discomfort with jaw movement is common after ear surgery.  It usually subsides within one to two months.    Numbness of the ear - Temporary loss of skin sensation in and/or around the ear is common after ear surgery.  This numbness may involve the entire outer ear and may last for six months or more.    Dizziness: Common after ear surgery typically gradually resolves over few days. If severe and associated with significant nausea and vomiting, contact the office.     May have Tylenol after: 1:30 pm    May have Ibuprofen/advil/motrin/aleve after: 10:30 am    TO REACH YOUR PHYSICIAN AFTER HOURS CALL  AND ASK FOR THE PHYSICIAN ON CALL

## 2024-07-18 NOTE — ANESTHESIA PROCEDURE NOTES
Airway  Date/Time: 7/18/2024 7:39 AM  Urgency: elective      Staffing  Performed: attending   Authorized by: Leo Whelan MD    Performed by: ANNY Araujo  Patient location during procedure: OR    Indications and Patient Condition  Indications for airway management: airway protection and anesthesia  Spontaneous Ventilation: absent  Sedation level: deep  Preoxygenated: yes  Patient position: sniffing  Mask difficulty assessment: 1 - vent by mask    Final Airway Details  Final airway type: supraglottic airway      Successful airway: unique  Size 4     Number of attempts at approach: 1

## 2024-07-18 NOTE — H&P
History Of Present Illness  Christa Christie is a 58 y.o. female presenting with bilateral sensorineural hearing loss, who met criteria for cochlear implantation. Given this, decision was made to proceed to the operating room for placement of cochlear implant. This was after discussing the risks, benefits, and alternatives of proceeding. There have been no major changes to patient's medical status since the outpatient ENT visit. Patient is overall in usual state of health this morning.      Past Medical History  She has a past medical history of Coronary artery calcification seen on CT scan (2024), Coronary artery disease involving native coronary artery of native heart without angina pectoris (2024), Encounter for gynecological examination (general) (routine) without abnormal findings (2019), Encounter for gynecological examination (general) (routine) without abnormal findings (2019), GERD (gastroesophageal reflux disease), HL (hearing loss), Hyperlipidemia, Hypertension, Lupus (Multi), Panic disorder (episodic paroxysmal anxiety), Personal history of other diseases of the nervous system and sense organs, Personal history of other specified conditions, Respiratory complication, postoperative, and Unspecified asthma, uncomplicated ([facility]).    Surgical History  She has a past surgical history that includes Back surgery (10/22/2014);  section, classic (2022); Other surgical history (06/10/2020); and Other surgical history (06/10/2020).     Social History  She reports that she quit smoking about 7 years ago. Her smoking use included cigarettes. She started smoking about 37 years ago. She has a 30 pack-year smoking history. She has been exposed to tobacco smoke. She has never used smokeless tobacco. She reports current alcohol use of about 2.0 standard drinks of alcohol per week. She reports that she does not use drugs.    Family History  Family History   Problem Relation Name Age  of Onset    Other (cardiac disorder) Mother      Lung cancer Mother  85    Other (cardiac disorder) Father      Breast cancer Neg Hx          Allergies  Patient has no known allergies.    ROS:  Complete ROS negative other than mentioned in the HPI.     PHYSICAL EXAMINATION:  General Healthy-appearing, well-nourished, well groomed, in no acute distress.   Neuro: Developmentally appropriate for age. Reacts appropriately to commands or stimuli.   Extremities Normal. Good tone.  Respiratory No increased work of breathing. Chest expands symmetrically. No stertor or stridor at rest.  Cardiovascular: No peripheral cyanosis. No jugular venous distension.   Head and Face: Atraumatic with no masses, lesions, or scarring.   Eyes: EOM intact, conjunctiva non-injected, sclera white.   Nose: no external nasal lesions, lacerations, or scars.  Neck: Symmetrical, trachea midline.   Skin: Normal without rashes or lesions.       Last Recorded Vitals  Blood pressure 145/71, pulse 70, temperature 36.5 °C (97.7 °F), temperature source Temporal, resp. rate 16, weight 75.4 kg (166 lb 3.6 oz), SpO2 97%.    Assessment/Plan   Christa Christie is a 58 y.o. female presenting with sensorineural hearing loss who met criteria for cochlear implantation. Given this, decision was made to proceed to the operating room for placement of cochlear implant on the right side.    Risks, benefits, and alternatives were discussed with the patient. All other questions were answered.     Plan for discharge home following surgery.

## 2024-07-18 NOTE — ANESTHESIA PREPROCEDURE EVALUATION
Patient: Christa Christie    Procedure Information       Date/Time: 07/18/24 0730    Procedure: Insertion Right Cochlear Implant 632/612 as back up (Right)    Location: [facility] OR 02 / Virtual [facility] OR    Surgeons: Corrina Pacheco MD            Relevant Problems   Cardiac   (+) Coronary artery disease   (+) Coronary artery disease involving native coronary artery of native heart without angina pectoris   (+) Hypertension   (+) Mixed hyperlipidemia      Neuro   (+) Anxiety   (+) Depression   (+) Right lumbar radiculopathy      GI   (+) GERD (gastroesophageal reflux disease)      Musculoskeletal   (+) Cervical spinal stenosis   (+) Systemic lupus erythematosus (Multi)      HEENT   (+) Bilateral asymmetric sensorineural hearing loss   (+) Sensory hearing loss, bilateral       Clinical information reviewed:   Tobacco  Allergies    Med Hx  Surg Hx   Fam Hx  Soc Hx        NPO Detail:  No data recorded     Physical Exam    Airway  Mallampati: III     Cardiovascular - normal exam  Rhythm: regular  Rate: normal     Dental - normal exam     Pulmonary - normal exam     Abdominal        Anesthesia Plan    History of general anesthesia?: yes  History of complications of general anesthesia?: no    ASA 2     general     The patient is not a current smoker.    intravenous induction   Anesthetic plan and risks discussed with patient and spouse.    Plan discussed with CAA.

## 2024-07-18 NOTE — OP NOTE
Insertion Right Cochlear Implant 632/612 as back up (R) Operative Note     Date: 2024  [facility]    Name: Christa Christie, : 1965, Age: 58 y.o., MRN: [id number], Sex: female    Diagnosis  Pre-op Diagnosis      * Sensory hearing loss, bilateral [H90.3] Post-op Diagnosis     * Sensory hearing loss, bilateral [H90.3]     Procedures  Insertion Right Cochlear Implant 632/612 as back up  77540 - MD COCHLEAR DEVICE IMPLANTATION W/WO MASTOIDECTOMY    MD COCHLEAR DEVICE []  Surgeons      * Corrina Pacheco - Primary    Resident/Fellow/Other Assistant:  Surgeons and Role:  * No surgeons found with a matching role *    Procedure Summary  Anesthesia: Anesthesia type not filed in the log.  ASA: ASA status not filed in the log.  Anesthesia Staff: Anesthesiologist: MD RENARD Torres-AA: ANNY Araujo  Estimated Blood Loss: 5 mL  Intra-op Medications:   Administrations occurring from 0730 to 0945 on 24:   Medication Name Total Dose   sodium chloride 0.9 % irrigation solution 3,000 mL   polymyxin B injection 500,000 Units   gelatin sponge,absorb-porcine (Gelfoam) sponge 2 each              Anesthesia Record               Intraprocedure I/O Totals       None           Specimen: No specimens collected     Staff:   Yolanda: Corinna  Circulator: Verónica Yu Person: Radha         Drains and/or Catheters: * None in log *    Tourniquet Times:         Implants:  Implants       Type Name Action Serial No.      ENT Implant COCHLEAR, NUCLEUS , PROFILE PLUS W/SLIM MODIOLAR ELECTRODE - [id number] Implanted [id number]              Pre-operative diagnosis:   Bilateral severe to profound sensorineural hearing loss    Post-operative diagnosis:  Bilateral severe to profound sensorineural hearing loss     Procedure:  Right cochlear implantation using a Nucleus  device with mastoidectomy   Intraoperative facial nerve monitoring  Microsurgical techniques requiring use of  operating microscope   Neural response telemetry    Surgeon:  Corrina Pacheco MD    Assistant surgeon: Arash Sheth MD    Anesthesia: general endotracheal    Estimated blood loss: 5 mL    History: This patient presented for evaluation of bilateral hearing loss and possibly cochlear implantation to rehabilitate their hearing loss. The patient met the audiometric candidacy criteria and demonstrated good understanding and motivation. Imaging obtained showed adequate mastoid and inner ear anatomy to allow placement of the implant. Appropriate counseling was conducted by our implant team engaging the patient and their family. The risks were discussed and included but not limited to bleeding, infection, device failure, dizziness, tinnitus, taste disturbance and rarely facial paralysis. The benefits of hearing preservation were reviewed. The patient understands that preserving residual hearing cannot be guaranteed despite every effort to achieve it. The patient elected to proceed with cochlear implantation. A hearing preserving electrode was chosen. The site was selected based on anatomical, audiometric, medical and personal factors.     Operative findings: the active electrode array was introduced using the transmastoid-facial recess approach through a round window cochleostomy. The basal turn was patent and full insertion was achieved. The cochleostomy was sealed with free muscle grafting. Impedances and Neural responses were appropriate. SmartNav confirmed adequate placement and coiling of the active electrode array within the cochlea.    Operative note: The patient was met in the pre-operative area. The informed consent was reviewed and signed. The correct side was marked. The patient was then taken back to the operative suite and laid on the operating table. A time out was conducted according to the protocol. General anesthesia was induced, and endotracheal intubation was done. No long-acting paralytic agents were  used. Intravenous pre-operative antibiotics were administered. The table was tilted 180 degrees. The patient was secured and strapped to the surgical bed. The electrodes of the facial nerve monitoring system were inserted percutaneously in the orbicularis oris and oculi respectively. The circuit was checked, and adequate responses were obtained.   The operative ear was exposed.  A strip of hair was removed using a hair clipper. The ear and post-auricular area were prepped and draped using the standard sterile techniques.   A mixture of 1% lidocaine and 1/100.000 epinephrine was injected in the post-auricular region. A curvilinear post-auricular incision slightly curved superiorly was carried out and skin flaps were elevated along the plane of the superficial temporalis fascia.  A pocket was bluntly dissected in the tissue plane above the temporalis muscle to house the internal  stimulator. An anterior based musculo-periosteal flap was outlined and elevated in order to expose the mastoid cortex.  A sub-muscular tunnel was created anteriorly to house the extra-temporal electrode. Small piece of fascia was harvested and prepared to later pack the cochleostomy. A cortical mastoidectomy is completed using a combination of cutting and rosie burrs. The facial recess was then opened with a small rosie kiersten and the posterior mesotympanum was exposed. A good view of the round window niche was achieved by removing bone medial to the facial nerve. Next a bony seat was created in the calvarial bone using cutting and rosie burrs. A well was also created to connect the seat to the mastoid cavity. Appropriate sizing was confirmed using the template. Next, I performed irrigation and achieved hemostasis. Then, I removed the bony overhand and pseudo-membrane to visualize the round window membrane proper. I then the round window was incised and opened with a micro-hook ensuring adequate size for electrode placement and  then carefully sealed with Healon to minimize russell-lymphatic oozing. No ossification was present. The internal- was placed in the bony recess and secured. The active electrode sheath was gradually advanced into the cochleostomy. When the sheath stopper reached the round window, the handle was stabilized and the electrode was advanced off the sheath achieving full insertion. The electrode sheath is then pulled back and removed.  The cochleostomy was then sealed with fascia and Healon was used to fill the middle ear space. The ground electrode was inserted into the previously created sub-muscular pocket. The electrodes were carefully coiled in the mastoid. The Musculo-periosteal flap was reflected back and reapproximated with interrupted 3.0 vicryl. Deep and superficial dermal closure was achieved using 3.0 vicryl. Steri-strips were placed along the incision line and a mastoid dressing was applied. Neural responses and impedances were checked and were noted to be appropriate. All needle and sponge counts were correct. The table was tilted back to the anesthesiologist and the patient was awakened from anesthesia, extubated and transferred to recovery in good condition.     I was present and supervised the entire procedure.

## 2024-07-18 NOTE — PERIOPERATIVE NURSING NOTE
Patient is A&O x4, VSS, respers even and unlabored. Discharge instructions, follow up, and medications reviewed. Questions answered, patient and family verbalized understanding.  DELFINO Hill RN at bedside with instructions.

## 2024-07-18 NOTE — PROGRESS NOTES
Cochlear Implant Intra-Operative Monitoring   Date: 7/18/24   Surgeon: Dr. Pacheco  Audiologist / Audiology Extern:  Dr. Cuate Montemayor, PhD Geronimo / RICKEY Martinez      Implanted Ear: Right   : [employer]  Implant:   Serial Number: [id number]      Electrode Insertion Achieved: Full    Placement Check: Within normal limits    Impedances:  Impedances were measured on electrodes 1-22 and within normal limits.     eSRT: ESRTs were obtained using a pulse width of 50 and a stimulation rate of 900 Hz.  Responses were obtained at electrodes 1, 6, 12, 17, and 22.       NRTs: Completed on all electrodes.         Equipment and Forms:   The patient's equipment backpack was provided to the family. They were counseled to bring the backpack to activation. Post-operative course was reviewed with the family. The patient is instructed to stop using their hearing aid on the newly implanted ear. They are encouraged to continue using their hearing aid on the non-implanted ear.     The family was provided with the internal implant guide, MRI compatibility booklet, and implant identification card specific to the patient's implant. The patient should place this in their wallet. The family was counseled that the internal implant was registered; indicating the start of the 10 year 's warranty. The external equipment, located in the backpack provided, will be registered at the activation; indicating the start of the 5 year 's warranty on the processor.     The activation follow up schedule was reviewed and a completed form with the appointments was provided. The auditory verbal therapy (AVT) form was provided to the family. The patient is guided to schedule AVT between the 2nd and 3rd activation by the Cochlear Implant team.     Post operative questions should be guided to the Patient  Navigator Cochlear Implant Program, Kristine Henry RN at [phone].    Appointments  The patient is scheduled for follow up with Geronimo Morel CCC-A at the [facility] [place].    Activation:   Date/Time : 8/16/24, 2:30 pm    2nd Stimulation:  Date/Time: 9/13/24, 1:00 pm    3rd Stimulation:  Date/Time:  11/6/24, 2:30 pm      Intraoperative testing was completed by RICKEY Martinez. Reviewed by Franco Khoury, Ph.D. ANDRE

## 2024-07-18 NOTE — ANESTHESIA POSTPROCEDURE EVALUATION
Patient: Christa Christie    Procedure Summary       Date: 07/18/24 Room / Location: [facility] OR 02 / [facility]    Anesthesia Start: 0731 Anesthesia Stop: 0941    Procedure: Insertion Right Cochlear Implant 632/612 as back up (Right) Diagnosis:       Sensory hearing loss, bilateral      (Sensory hearing loss, bilateral [H90.3])    Surgeons: Corrina Pacheco MD Responsible Provider: Leo Whelan MD    Anesthesia Type: general ASA Status: 2            Anesthesia Type: general    Vitals Value Taken Time   /84 07/18/24 0959   Temp 36.1 07/18/24 0959   Pulse 82 07/18/24 0959   Resp 16 07/18/24 0959   SpO2 98 07/18/24 0959       Anesthesia Post Evaluation    Patient location during evaluation: PACU  Patient participation: complete - patient participated  Level of consciousness: awake  Pain management: adequate  Multimodal analgesia pain management approach  Airway patency: patent  Cardiovascular status: stable  Respiratory status: acceptable  Hydration status: acceptable  Postoperative Nausea and Vomiting: none  Comments: Did well      No notable events documented.

## 2024-07-29 DIAGNOSIS — I10 ESSENTIAL (PRIMARY) HYPERTENSION: ICD-10-CM

## 2024-07-29 RX ORDER — METOPROLOL SUCCINATE 50 MG/1
50 TABLET, EXTENDED RELEASE ORAL DAILY
Qty: 90 TABLET | Refills: 3 | Status: SHIPPED | OUTPATIENT
Start: 2024-07-29

## 2024-08-02 NOTE — PROGRESS NOTES
History of present illness:  Christa Christie is a 58 y.o. female presenting today for E/M of follow-up.  This is her first postoperative visit following a right cochlear implant placement.  She is currently doing well.  Denies any significant pain or dizziness.    RECALL 05/09/2024:  Christa Christie is a 58 y.o. female is referred for possible cochlear implant for rehabilitation of progressive hearing loss. The patient is accompanied by her .   The approximate duration of the patient's hearing loss is 8 years. Progressive hearing loss has accelerated in the last three years. The patient currently is aided in both with limited benefit and appears to be motivated with realistic expectations.and  dysequilibrium also. She notes her better hearing ear is the left ear.  She denies ear pain, discharge from ear, tinnitus, dizziness and vertigo. They also deny a history of prior ear surgery, noise exposure and family history of hearing loss. She endorses exposure to hydrochloroquine for SLE with significant benefit.    The patient's current medications, active allergies and list of medical problems were reviewed in the EHR and confirmed electronically there are as follows;    Allergies:   No Known Allergies  Current list of medications:   Current Outpatient Medications   Medication Sig Dispense Refill    aspirin 81 mg EC tablet Take 1 tablet (81 mg) by mouth once daily.      atorvastatin (Lipitor) 40 mg tablet TAKE 1 TABLET BY MOUTH EVERY DAY 90 tablet 1    esomeprazole (NexIUM) 20 mg DR capsule TAKE 1 CAPSULE BY MOUTH ONCE DAILY BEFORE EATING 90 capsule 3    hydroxychloroquine (Plaquenil) 200 mg tablet TAKE 1& 1/2 TABLETS BY MOUTH DAILY 135 tablet 0    metoprolol succinate XL (Toprol-XL) 50 mg 24 hr tablet TAKE 1 TABLET BY MOUTH EVERY DAY 90 tablet 3    sucralfate (Carafate) 1 gram tablet TAKE 1 TABLET (1 G) BY MOUTH 2 TIMES A DAY BEFORE MEALS. 180 tablet 0    triamcinolone (Kenalog) 0.1 % cream APPLY AND RUB IN A  THIN FILM TO AFFECTED AREAS TWICE DAILY.(AM AND PM).       No current facility-administered medications for this visit.     Problem list:  Patient Active Problem List   Diagnosis    Anxiety    Bilateral asymmetric sensorineural hearing loss    Coronary artery disease    Cervical spinal stenosis    Depression    Discoid lupus erythematosus of left eyelid    Discoid lupus erythematosus of right eyelid    GERD (gastroesophageal reflux disease)    Hallux rigidus of right foot    Hematuria, microscopic    Hypertension    Back pain with radiation    Lung nodule    Medication side effect    Neuritis    OAB (overactive bladder)    Systemic lupus erythematosus (Multi)    Bowel habit changes    Acute midline low back pain with right-sided sciatica    Mixed hyperlipidemia    Anterolisthesis of lumbar spine    Right lumbar radiculopathy    Sacroiliac pain    Severe rt groin pain    Trochanteric bursitis of right hip    Discoid lupus erythematosus    History of substance dependence (Multi)    Coronary artery disease involving native coronary artery of native heart without angina pectoris    Coronary artery calcification seen on CT scan    Sensory hearing loss, bilateral    Cognitive communication deficit    Other symbolic dysfunctions       Physical Examination:  CONSTITUTIONAL:  No acute distress  VOICE:  No hoarseness or other abnormality  RESPIRATION:  Breathing comfortably, no stridor  CV:  No clubbing/cyanosis/edema in hands  EYES:  EOM intact, sclera clear  NEURO:  Alert and oriented times 3, Cranial nerves II-XII grossly intact and symmetric bilaterally  HEAD AND FACE:  Symmetric facial features, no masses or lesions  RIGHT EAR:  Incision is healing well, ear canal clear, tympanic membrane intact.  LEFT EAR: Normal external ear and post auricular area, no visible lesions, external auditory canal patent, tympanic membrane intact, no retraction, no signs of mass, effusion, or infection within the middle ear  NOSE:  Anterior  rhinoscopy clear, no significant external deformity.  ORAL CAVITY/OROPHARYNX/LIPS:  normal lining, mobile tongue.  PHARYNGEAL WALLS: Moist mucosal lining, good palatal elevation  NECK/LYMPH:  No LAD, no thyroid masses, trachea midline  SKIN:  Neck and facial skin is without scar or injury  PSYCH:  Alert and oriented with appropriate mood and affect        Impression:  Bilateral Sensorineural hearing loss,   Cochlear implant in place    Recommendation:  Questions were answered to the patient's satisfaction.   Clear for activation. Follow-up in 4 months. Consider implant on left ear if done well.        I discussed with the patient the complexity of my medical decision making including the treatment and testing rational, indications of their elective procedure and possible adverse effects and/or complications. Based on the provided documentation and my professional assessment of this patient's chronic condition, the complexity of evaluation and treatment is low.    This note was created using speech recognition transcription software. Despite proofreading, several typographical errors might be present that might affect the meaning of the content. Please call with any questions.    Scribe Attestation  By signing my name below, I, Katie Mckeon , [place]   attest that this documentation has been prepared under the direction and in the presence of Corrina Pacheco MD.

## 2024-08-08 ENCOUNTER — APPOINTMENT (OUTPATIENT)
Dept: OTOLARYNGOLOGY | Facility: CLINIC | Age: 59
End: 2024-08-08
Payer: MEDICARE

## 2024-08-08 VITALS — TEMPERATURE: 98.6 F

## 2024-08-08 DIAGNOSIS — H90.3 BILATERAL SENSORINEURAL HEARING LOSS: Primary | ICD-10-CM

## 2024-08-08 PROCEDURE — 99024 POSTOP FOLLOW-UP VISIT: CPT | Performed by: OTOLARYNGOLOGY

## 2024-08-08 PROCEDURE — 1036F TOBACCO NON-USER: CPT | Performed by: OTOLARYNGOLOGY

## 2024-08-10 DIAGNOSIS — I25.10 ATHEROSCLEROTIC HEART DISEASE OF NATIVE CORONARY ARTERY WITHOUT ANGINA PECTORIS: ICD-10-CM

## 2024-08-12 RX ORDER — ATORVASTATIN CALCIUM 40 MG/1
TABLET, FILM COATED ORAL
Qty: 90 TABLET | Refills: 1 | Status: SHIPPED | OUTPATIENT
Start: 2024-08-12 | End: 2024-11-10

## 2024-08-16 ENCOUNTER — CLINICAL SUPPORT (OUTPATIENT)
Dept: AUDIOLOGY | Facility: CLINIC | Age: 59
End: 2024-08-16
Payer: MEDICARE

## 2024-08-16 DIAGNOSIS — H90.3 SENSORINEURAL HEARING LOSS, BILATERAL: Primary | ICD-10-CM

## 2024-08-16 PROCEDURE — 92553 AUDIOMETRY AIR & BONE: CPT | Performed by: AUDIOLOGIST

## 2024-08-16 PROCEDURE — 92603 COCHLEAR IMPLT F/UP EXAM 7/>: CPT | Performed by: AUDIOLOGIST

## 2024-08-16 NOTE — PROGRESS NOTES
COCHLEAR IMPLANT ACTIVATION         Name:  Christa Christie  :  1965  Age:  59 y.o.  Date of Evaluation:  2024     RIGHT DEVICE  External Device: Cochlear [id number] (N8) sound processor  Internal Device: Cochlear [employer]   Surgeon: Corrina Pacheco MD  Implantation Date: 2024  Activation Date: 2024    LEFT DEVICE  External Device: Miracle Ear NIRMAL    HISTORY  Christa Christie arrives today for the initial activation of the Cochlear [employer]  cochlear implantation (SN: [id number]) in the right cochlea used in conjunction with a Cochlear [id number] (N8) sound processor. The patient has been medically evaluated and cleared by their surgeon to continue with today's activation. The patient reports doing well since surgery.    PROGRAMMING  Headset pressure, magnet strength (3I), and incision site were checked with no problems noted.     Electrode impedances, used to monitor internal device function, were measured across the electrode array.  Impedance measures were within normal limits for all electrodes, in each stimulation mode. Current parameters of MP1+2 stimulation mode, an ACE speech processing strategy, 8 maxima, and a 900 Hz stimulation rate were maintained. Programming consisted of using the population mean protocol. Enabled NRT measurements from surgery. Increased globally with live speech to loud but comfortable. Swept Comfort (C) levels and increased until loud but comfortable (MAP 1). Patient reports speech is clear and loud but comfortable.  Swept C levels with no nonauditory stimulation observed. Created progressive programs. Current programming consists of:  P1 - MAP 1 - SCAN 2/ V6/ S12  P2 - MAP 2 - SCAN 2/ V6/ S12 - Cs +5 CL  P3 - MAP 3 - SCAN 2 / V6/ S12 - Cs + 10 CL  P4 - MAP 4 - SCAN 2/ V6/ S12- Cs +15 CL    All equipment was registered with [employer] by this clinician today.    Did you meet with the [employer]  prior to today's appointment:   No  If no, Why? Unaware of need to schedule  Did you find this meeting helpful? N/A  Would you recommend this meeting to a family member or friend who is considering a cochlear implant:  N/A    Was patient more prepared for CI programming/activation due to meeting with the [employer] :  N/A    UNAIDED TESTING  RIGHT: Normal ear canal volume with reduced mobility. Severe to profound sensorineural hearing loss   LEFT: Normal middle ear pressure, mobility, and ear canal volume. Moderate to profound sensorineural hearing loss     IMPRESSIONS  Positive stimulation on all active electrodes.  A reliable psychophysical MAP was created in the ACE speech processing strategy. Programming completed with population mean. Unaided testing indicated some residual hearing in the right, implanted ear but EAS not indicated..     All components were reviewed with the patient and family including use of remote control/speech processor and the associated buttons, attaching/removing the rechargeable and disposable batteries, and charging units. Patient and family were advised of the written and video instruction material within the CI equipment to supplement today's counseling on the equipment. She was advised to complete daily auditory training in the CI only condition. This included using a closed-set, spondaic word list, use of auditory tracking techniques, discussed Hearing Therapy Manual, and Félix Sounds to assist with detection and identification skills as well as other resources. This was demonstrated for the patient and family. The patient was advised that (s)he should not experience any discomfort or dizziness, and to contact sooner if symptoms arise.    RECOMMENDATIONS  1. Continue medical follow-up with your neuro-otologist (Dr. Verdugo, Dr. Velez, Dr. Olson or Dr. Pacheco)  2. Utilize the [employer] Nucleus Cochlear [id number] (N8) sound processor speech processor daily using the most tolerated  program. Utilize progressive programs in an attempt to increase stimulation over the next week however to maintain in the most tolerated program. Work toward program 4 by the next appointment if tolerated.  3. Return in 1 month for your second stimulation.   4. Utilize aural rehabilitation/auditory training programs, books on tape, and written materials at home to improve speech understanding ability.  5. Communication strategies were discussed (utilizing visual cues/gestures; reducing background noise and distance from desired source; increasing light to assist with visual cues; use of clear speech).  6. Contact [facility] CI Team auditory-verbal therapist to initiate aural rehabilitation therapy.    VANDANA Wu, CCC-A  Senior Clinical Audiologist    TIME: 230-400

## 2024-09-13 ENCOUNTER — CLINICAL SUPPORT (OUTPATIENT)
Dept: AUDIOLOGY | Facility: CLINIC | Age: 59
End: 2024-09-13
Payer: MEDICARE

## 2024-09-13 DIAGNOSIS — H90.3 SENSORINEURAL HEARING LOSS, BILATERAL: Primary | ICD-10-CM

## 2024-09-13 PROCEDURE — 92604 REPROGRAM COCHLEAR IMPLT 7/>: CPT | Performed by: AUDIOLOGIST

## 2024-09-13 NOTE — PROGRESS NOTES
COCHLEAR IMPLANT SECOND STIMULATION         Name:  Christa Christie  :  1965  Age:  59 y.o.  Date of Evaluation:  2024     RIGHT DEVICE  External Device: Cochlear [id number] (N8) sound processor  Internal Device: Cochlear [employer]   Surgeon: Corrina Pacheco MD  Implantation Date: 2024  Activation Date: 2024    LEFT DEVICE  External Device: Miracle Ear NIRMAL    HISTORY  Christa Christie arrives today for the initial activation of the Cochlear [employer]  cochlear implantation (SN: [id number]) in the right cochlea used in conjunction with a Cochlear [id number] (N8) sound processor. The patient reports using P4 for several weeks. Feels like she is hearing better but difficulty on the phone. Interested in obtaining a left bilateral sequential CI.    PROGRAMMING  Headset pressure, magnet strength (3I), and incision site were checked with no problems noted. Datalogging revealed 13+ hours of use per day with 3+ hours in speech, on average since the last appointment.    Electrode impedances, used to monitor internal device function, were measured across the electrode array.  Impedance measures were within normal limits for all electrodes, in each stimulation mode. Current parameters of MP1+2 stimulation mode, an ACE speech processing strategy, 8 maxima, and a 900 Hz stimulation rate were maintained. Programming consisted of using the population mean protocol. Enabled NRT measurements from surgery. Increased globally with live speech to loud but comfortable. Swept Comfort (C) levels and increased until loud but comfortable (MAP 1). Patient reports speech is clear and loud but comfortable.  Swept C levels with no nonauditory stimulation observed. Created progressive programs. Current programming consists of:  P1 - MAP 5- SCAN 2/ V6/ S12  P2 - MAP 6 - SCAN 2/ V6/ S12 - Cs +5 CL  P3 - MAP 7 - SCAN 2 / V6/ S12 - Cs + 10 CL  P4 - MAP 8 - SCAN 2/ V6/ S12- Cs +15 CL    FUNCTIONAL TESTING  Testing prior to  "programming at user settings (P1/V6/S12)    All testing was completed in the soundfield at 0 degrees azimuth. Pure tone testing was obtained using pulsed frequency modulating (FM) stimuli, and SRT was obtained using monitored live voice (MLV). Sentence and word recognition testing was performed with recorded material at 60 dBSPL.    LISTENING CONDITION Aided thresholds 250-6000 Hz CNC Words  [employer] Sentences in Quiet  [employer] Sentences in +10 SNR 4-talker babble (S0N0)    Right CI only; left ear plugged 15-30 dB HL 76% DNT DNT       Repeated all spondee words on initial presentation at 3' in quiet in the right CI only condition. Repeated all LMH sounds on initial presentation at 3' in quiet in the right CI only condition with the exception of saying \"oo\" for \"m\".    IMPRESSIONS  Positive stimulation on all active electrodes.  A reliable psychophysical MAP was created in the ACE speech processing strategy. Programming completed with population mean.     Good functional gain and word recognition. This clinician contacted Corrina Pacheco MD and Kristine Henry RN to see if patient can move forward with bilateral sequential, left CI.    RECOMMENDATIONS  1. Continue medical follow-up with your neuro-otologist (Dr. Verdugo, Dr. Velez, Dr. Olson or Dr. Pacheco)  2. Utilize the Cochle[employer] Nucleus Cochlear [id number] (N8) sound processor speech processor daily using the most tolerated program. Utilize progressive programs in an attempt to increase stimulation over the next week however to maintain in the most tolerated program. Work toward program 4 by the next appointment if tolerated.  3. Return in 2 month for your third stimulation.   4. Utilize aural rehabilitation/auditory training programs, books on tape, and written materials at home to improve speech understanding ability.  5. Communication strategies were discussed (utilizing visual cues/gestures; reducing background noise and distance from desired source; increasing " light to assist with visual cues; use of clear speech).  6. Contact [facility] CI Team auditory-verbal therapist to initiate aural rehabilitation therapy.    VANDANA Wu, CCC-A  Senior Clinical Audiologist    TIME: 100-071

## 2024-09-13 NOTE — Clinical Note
Just saw patient for 2nd stim - interested to see if she can get bilateral sequential before the year end? She isnt scheduled with [facility] until Dec.

## 2024-09-16 ENCOUNTER — APPOINTMENT (OUTPATIENT)
Dept: SPEECH THERAPY | Facility: CLINIC | Age: 59
End: 2024-09-16
Payer: MEDICARE

## 2024-09-16 DIAGNOSIS — H90.3 SENSORY HEARING LOSS, BILATERAL: ICD-10-CM

## 2024-09-16 DIAGNOSIS — R48.8 OTHER SYMBOLIC DYSFUNCTIONS: Primary | ICD-10-CM

## 2024-09-16 PROCEDURE — 92523 SPEECH SOUND LANG COMPREHEN: CPT | Mod: GN,95

## 2024-09-16 ASSESSMENT — PAIN SCALES - GENERAL: PAINLEVEL_OUTOF10: 0 - NO PAIN

## 2024-09-16 ASSESSMENT — PAIN - FUNCTIONAL ASSESSMENT: PAIN_FUNCTIONAL_ASSESSMENT: 0-10

## 2024-09-16 NOTE — PROGRESS NOTES
Speech-Language Pathology    Auditory Evaluation      Patient Name: Christa Christie  MRN: [id number]  Today's Date: 9/18/2024  Time Calculation  Start Time: 1305  Stop Time: 1415  Time Calculation (min): 70 min     Current Problem:  Patient Active Problem List   Diagnosis    Anxiety    Bilateral asymmetric sensorineural hearing loss    Coronary artery disease    Cervical spinal stenosis    Depression    Discoid lupus erythematosus of left eyelid    Discoid lupus erythematosus of right eyelid    GERD (gastroesophageal reflux disease)    Hallux rigidus of right foot    Hematuria, microscopic    Hypertension    Back pain with radiation    Lung nodule    Medication side effect    Neuritis    OAB (overactive bladder)    Systemic lupus erythematosus (Multi)    Bowel habit changes    Acute midline low back pain with right-sided sciatica    Mixed hyperlipidemia    Anterolisthesis of lumbar spine    Right lumbar radiculopathy    Sacroiliac pain    Severe rt groin pain    Trochanteric bursitis of right hip    Discoid lupus erythematosus    History of substance dependence (Multi)    Coronary artery disease involving native coronary artery of native heart without angina pectoris    Coronary artery calcification seen on CT scan    Sensory hearing loss, bilateral    Cognitive communication deficit    Other symbolic dysfunctions      Impressions and Recommendations:  MODERATE-SEVERE Auditory Skills deficits impacting communication; skilled therapy warranted to increase communication.  Recommendations: Communication Therapy, Home Program  Follow-up aural rehab therapy  Continue follow up with ENT and AUD as recommended    GOALS:  Long Term Goal:  Patient will comprehend conversation in noise with 80% accuracy in 6 months.     Short Term Goals:     Patient will comprehend sentences in quiet and noise with no visual cues with 90% accuracy, in 3 months.  Patient comprehend  2-3 sentences in quiet and noise with no visual cues with 90%  accuracy in 4 months.  Patient will comprehend 2-3 paragraphs in quiet and noise with 90% accuracy in 6 months.    Resonance-Voice Assessment:  Assessments Used: Informal  Articulation Screening: Age appropriate  Nasal Resonance: Normal  Nasal Air Emissions: Not present  Voice: Normal  Speech Inteligibility: 100%    Patient Subjective Assessment:  On TELEGRAM, Pt Reported Score - Telephone: 1  Pt Reported Score - Groups: 5  Pt Reported Score - Employment:  (N/A)  Pt Reported Score - Recreation: 5  Pt Reported Score - Legislation: Other:(comment) (N/A)  Pt Reported Score - Alarms: 1  Pt Reported Score - Entertainment: 5  Pt Reported Score - Members of family: 1    Auditory Skill Assessment:  Amplification used skill assessment: Right    Auditory Plan of Care:  Recommend Treatment: Yes  Frequency: 1 time per month  Duration: 6 months  Recommendations for Therapeutic Intervention: Given a Home Program Targeting Goal(s)  Prognosis: Excellent  Factors Affecting Prognosis: None  Discussed POC: Patient  The patient's family/caregiver was educated regarding appropriate motivation and expectations for a cochlear implant (CI) and the CI process.: Yes      Subjective   Current Problem:  Patient with sudden bilateral sensorineural hearing loss in 2016.  Patient uses L hearing aid and now R CI.  RIGHT: Moderate hearing loss through 250 Hz sloping to profound sensorineural hearing loss through 8000 Hz. LEFT: Moderately severe hearing loss through 500 Hz sloping to profound  sensorineural hearing loss through 8000 Hz.     Patient arrived on time to today's virtual/telehealth aural rehab evaluation. Patient reports she is doing better with hearing environmental sounds (eg, rustling leaves, birds, etc), has started speaking on the phone using her R CI, and listening to songs in the car using bluetooth streaming.     General Visit Information:  Recommendations: Communication Therapy, Home Program  Living Environment: Home  Language  at home: Spoken English   Present: No  Arrival: Independent  Reason for Referral: Aural rehabilitation s/p cochlear implantation  Certification Period Start Date: 24  Certification Period End Date: 24  Prior Level of Function: WFL    Provider:  Audiology: Dr Gonzalez  ENT: Dr Pacheco    Pain:  Pain Assessment  Pain Assessment: 0-10  0-10 (Numeric) Pain Score: 0 - No pain    Objective     Baseline Observations:  Hearing Loss: Progressive (Over time)  Duration of Hearing Loss: 20+ years  Etiology of hearing loss: unknown  Current Amplification: Worn during evaluation  Left Ear: Hearing Aid  Right Ear: Cochlear Implant  Date of amplification right: 24  Amplification Worn During Evaluation: Yes  Hours Worn: Whenever awake      Pre-Feature Identification Contrasts:  Presence vs Absence : 100%  Long vs Short : 100%  Continuous vs Interrupted : 100%  1 vs 3 Syllables : 100%  1 vs 2 Syllables : 100%  Same number of syllables : 100%  Total Percentage: 100%    Cochlear [facility] Re[facility] Manual Screening & Exercise:  Sentence Length Identification : 100%  Phrase Length Identification : 100%    Patient demonstrating suprasegmental identification WNL.    Common Phrase Test:  Common Phrase Test (CPT)  In Quiet : 70%  In Environmental Noise : 40%    Patient demonstrating mild deficits at sentence identification in quiet, severe deficits in environmental noise.    Minimal Pairs Test:  Voicin/8  Manner: 8/8  Place: 7/8  Vowel Place: 8/8  Vowel Height: 8/8  Total Score : 38    Auditory Education:  Treatment Performed Today: No  Individual(s) Educated: Patient  Verbal Education Provided: Results of Testing, Exercises, Aural Rehabilitation s/p cochlear implant activation, Listening Strategies, Other: (comment) (Hiram Talks)  Written Education Provided: Exercises (Hiram Talk handout)  Response to Educaton: Verbalized Understanding  Patient's Learning Preference(s): Demonstration, Printed Materials,  Explanation/Discussion  Patient/caregiver verbalized understanding and agreement.: Yes

## 2024-09-30 ENCOUNTER — APPOINTMENT (OUTPATIENT)
Dept: OTOLARYNGOLOGY | Facility: CLINIC | Age: 59
End: 2024-09-30
Payer: MEDICARE

## 2024-09-30 DIAGNOSIS — H90.3 SENSORY HEARING LOSS, BILATERAL: ICD-10-CM

## 2024-09-30 DIAGNOSIS — Z01.818 PRE-OP EVALUATION: ICD-10-CM

## 2024-09-30 PROCEDURE — 99024 POSTOP FOLLOW-UP VISIT: CPT | Performed by: OTOLARYNGOLOGY

## 2024-09-30 PROCEDURE — 1036F TOBACCO NON-USER: CPT | Performed by: OTOLARYNGOLOGY

## 2024-09-30 NOTE — PROGRESS NOTES
History of present illness:  Christa Christie is presenting for a follow-up visit.  This was done using the audiovisual platform.  She is recipient of her right cochlear implant.  She is doing really well with her cochlear implant and seems to be progressing very nicely.  She is interested in having a sequential left-sided cochlear implant.      The patient's current medications, active allergies and list of medical problems were reviewed in the EHR and confirmed electronically there are as follows;  Allergies:   No Known Allergies  Current list of medications:   Current Outpatient Medications   Medication Sig Dispense Refill    aspirin 81 mg EC tablet Take 1 tablet (81 mg) by mouth once daily.      atorvastatin (Lipitor) 40 mg tablet TAKE 1 TABLET BY MOUTH EVERY DAY 90 tablet 1    esomeprazole (NexIUM) 20 mg DR capsule TAKE 1 CAPSULE BY MOUTH ONCE DAILY BEFORE EATING 90 capsule 3    hydroxychloroquine (Plaquenil) 200 mg tablet TAKE 1& 1/2 TABLETS BY MOUTH DAILY 135 tablet 0    metoprolol succinate XL (Toprol-XL) 50 mg 24 hr tablet TAKE 1 TABLET BY MOUTH EVERY DAY 90 tablet 3    sucralfate (Carafate) 1 gram tablet TAKE 1 TABLET (1 G) BY MOUTH 2 TIMES A DAY BEFORE MEALS. 180 tablet 0    triamcinolone (Kenalog) 0.1 % cream APPLY AND RUB IN A THIN FILM TO AFFECTED AREAS TWICE DAILY.(AM AND PM).       No current facility-administered medications for this visit.     Problem list:  Patient Active Problem List   Diagnosis    Anxiety    Bilateral asymmetric sensorineural hearing loss    Coronary artery disease    Cervical spinal stenosis    Depression    Discoid lupus erythematosus of left eyelid    Discoid lupus erythematosus of right eyelid    GERD (gastroesophageal reflux disease)    Hallux rigidus of right foot    Hematuria, microscopic    Hypertension    Back pain with radiation    Lung nodule    Medication side effect    Neuritis    OAB (overactive bladder)    Systemic lupus erythematosus (Multi)    Bowel habit  changes    Acute midline low back pain with right-sided sciatica    Mixed hyperlipidemia    Anterolisthesis of lumbar spine    Right lumbar radiculopathy    Sacroiliac pain    Severe rt groin pain    Trochanteric bursitis of right hip    Discoid lupus erythematosus    History of substance dependence (Multi)    Coronary artery disease involving native coronary artery of native heart without angina pectoris    Coronary artery calcification seen on CT scan    Sensory hearing loss, bilateral    Cognitive communication deficit    Other symbolic dysfunctions         Physical Examination:  Deferred due to the virtual nature of this visit.    Diagnostic testing:    Cochlear implant evaluation reviewed in our system.  Her gain postoperatively on the right is 76% up from 12% which is excellent.        Impression:  Bilateral sensorineural hearing loss    Recommendation:  We will proceed with left-sided sequential cochlear implantation using a /612 for backup.  I discussed with the patient the complexity of my medical decision making including the treatment and testing rational, indications of their elective procedure and possible adverse effects and/or complications. Based on the provided documentation and my professional assessment of this patient's chronic condition, the complexity of evaluation and treatment is low.    This note was created using speech recognition transcription software. Despite proofreading, several typographical errors might be present that might affect the meaning of the content. Please call with any questions.

## 2024-10-07 DIAGNOSIS — M32.9 SYSTEMIC LUPUS ERYTHEMATOSUS, UNSPECIFIED: ICD-10-CM

## 2024-10-07 RX ORDER — HYDROXYCHLOROQUINE SULFATE 200 MG/1
TABLET, FILM COATED ORAL
Qty: 135 TABLET | Refills: 0 | Status: SHIPPED | OUTPATIENT
Start: 2024-10-07

## 2024-10-18 DIAGNOSIS — R19.4 BOWEL HABIT CHANGES: ICD-10-CM

## 2024-10-18 RX ORDER — SUCRALFATE 1 G/1
1 TABLET ORAL
Qty: 180 TABLET | Refills: 0 | Status: SHIPPED | OUTPATIENT
Start: 2024-10-18 | End: 2025-10-18

## 2024-10-28 ENCOUNTER — APPOINTMENT (OUTPATIENT)
Dept: PRIMARY CARE | Facility: CLINIC | Age: 59
End: 2024-10-28
Payer: MEDICARE

## 2024-10-28 VITALS
WEIGHT: 162.4 LBS | DIASTOLIC BLOOD PRESSURE: 70 MMHG | TEMPERATURE: 97.4 F | BODY MASS INDEX: 27.88 KG/M2 | SYSTOLIC BLOOD PRESSURE: 130 MMHG

## 2024-10-28 DIAGNOSIS — J06.9 UPPER RESPIRATORY TRACT INFECTION, UNSPECIFIED TYPE: Primary | ICD-10-CM

## 2024-10-28 DIAGNOSIS — Z13.6 SCREENING FOR CARDIOVASCULAR CONDITION: ICD-10-CM

## 2024-10-28 DIAGNOSIS — R73.01 ELEVATED FASTING GLUCOSE: ICD-10-CM

## 2024-10-28 LAB — POC HEMOGLOBIN A1C: 5.8 % (ref 4.2–6.5)

## 2024-10-28 PROCEDURE — 83036 HEMOGLOBIN GLYCOSYLATED A1C: CPT | Performed by: FAMILY MEDICINE

## 2024-10-28 PROCEDURE — 3075F SYST BP GE 130 - 139MM HG: CPT | Performed by: FAMILY MEDICINE

## 2024-10-28 PROCEDURE — 3078F DIAST BP <80 MM HG: CPT | Performed by: FAMILY MEDICINE

## 2024-10-28 PROCEDURE — 99214 OFFICE O/P EST MOD 30 MIN: CPT | Performed by: FAMILY MEDICINE

## 2024-10-28 RX ORDER — DOXYCYCLINE 100 MG/1
100 CAPSULE ORAL 2 TIMES DAILY
Qty: 14 CAPSULE | Refills: 0 | Status: SHIPPED | OUTPATIENT
Start: 2024-10-28 | End: 2024-11-04

## 2024-10-31 ENCOUNTER — LAB (OUTPATIENT)
Dept: LAB | Facility: LAB | Age: 59
End: 2024-10-31
Payer: MEDICARE

## 2024-10-31 DIAGNOSIS — R73.01 ELEVATED FASTING GLUCOSE: ICD-10-CM

## 2024-10-31 DIAGNOSIS — Z13.6 SCREENING FOR CARDIOVASCULAR CONDITION: ICD-10-CM

## 2024-10-31 LAB
CHOLEST SERPL-MCNC: 154 MG/DL (ref 0–199)
CHOLESTEROL/HDL RATIO: 2.2
HDLC SERPL-MCNC: 69.9 MG/DL
LDLC SERPL CALC-MCNC: 71 MG/DL
NON HDL CHOLESTEROL: 84 MG/DL (ref 0–149)
TRIGL SERPL-MCNC: 68 MG/DL (ref 0–149)
VLDL: 14 MG/DL (ref 0–40)

## 2024-10-31 PROCEDURE — 80061 LIPID PANEL: CPT

## 2024-10-31 PROCEDURE — 36415 COLL VENOUS BLD VENIPUNCTURE: CPT

## 2024-11-01 ENCOUNTER — APPOINTMENT (OUTPATIENT)
Dept: RHEUMATOLOGY | Facility: CLINIC | Age: 59
End: 2024-11-01
Payer: MEDICARE

## 2024-11-05 ENCOUNTER — APPOINTMENT (OUTPATIENT)
Dept: RHEUMATOLOGY | Facility: CLINIC | Age: 59
End: 2024-11-05
Payer: MEDICARE

## 2024-11-05 VITALS — SYSTOLIC BLOOD PRESSURE: 132 MMHG | DIASTOLIC BLOOD PRESSURE: 78 MMHG

## 2024-11-05 DIAGNOSIS — H01.124: Primary | ICD-10-CM

## 2024-11-05 PROCEDURE — 3078F DIAST BP <80 MM HG: CPT | Performed by: INTERNAL MEDICINE

## 2024-11-05 PROCEDURE — 99213 OFFICE O/P EST LOW 20 MIN: CPT | Performed by: INTERNAL MEDICINE

## 2024-11-05 PROCEDURE — 3075F SYST BP GE 130 - 139MM HG: CPT | Performed by: INTERNAL MEDICINE

## 2024-11-05 NOTE — PROGRESS NOTES
Subjective . Christa Christie is a 59 y.o. female who presents for Follow-up.    HPI. 59-year-old female with history of discoid lupus presented for follow-up.     She has a small dry patch at the left medial canthus area.  Right-sided hearing improved after cochlear implant.    Immunosuppression: Hydroxychloroquine.(5/2017).     Review of Systems   All other systems reviewed and are negative.    Objective     Blood pressure 132/78.    Physical Exam.  Gen. AAO x3, NAD.  HEENT: No pallor or icterus, PERRLA, EOMI.   Skin: Small dry patch at the left medial canthus area.  Heart: S1, S2/ RRR.   Lungs: CTA B.  Abdomen: Soft, NT/ND.  MSK: No swollen or tender joint.  Neuro: Sensation to touch intact.Strength 5/5 throughout.   Psych:Appropriate mood and behavior  EXT: No edema    Assessment/Plan  . 59-year-old female with history of discoid lupus presented for follow-up.     #1: Discoid lupus.  She is doing well.  -Continue hydroxychloroquine 300 mg daily.  -Labs reviewed.    Follow-up in 6 months.     This note was partially generated using the Dragon Voice recognition system. There may be some incorrect wording, spelling and/or spelling errors or punctuation errors that were not corrected prior to committing the note to the medical record.    Problem List Items Addressed This Visit    None    Active Ambulatory Problems     Diagnosis Date Noted    Anxiety 03/08/2023    Bilateral asymmetric sensorineural hearing loss 03/08/2023    Coronary artery disease 03/08/2023    Cervical spinal stenosis 03/08/2023    Depression 03/08/2023    Discoid lupus erythematosus of left eyelid 03/08/2023    Discoid lupus erythematosus of right eyelid 03/08/2023    GERD (gastroesophageal reflux disease) 03/08/2023    Hallux rigidus of right foot 03/08/2023    Hematuria, microscopic 03/08/2023    Hypertension 03/08/2023    Back pain with radiation 03/08/2023    Lung nodule 03/08/2023    Medication side effect 03/08/2023    Neuritis 03/08/2023     OAB (overactive bladder) 2023    Systemic lupus erythematosus (Multi) 2023    Bowel habit changes 2023    Acute midline low back pain with right-sided sciatica 2023    Mixed hyperlipidemia 2023    Anterolisthesis of lumbar spine 2023    Right lumbar radiculopathy 2023    Sacroiliac pain 2023    Severe rt groin pain 2023    Trochanteric bursitis of right hip 2023    Discoid lupus erythematosus 2008    History of substance dependence (Multi) 2024    Coronary artery disease involving native coronary artery of native heart without angina pectoris 2024    Coronary artery calcification seen on CT scan 2024    Sensory hearing loss, bilateral 2024    Cognitive communication deficit 2024    Other symbolic dysfunctions 2024    Elevated fasting glucose 10/28/2024     Resolved Ambulatory Problems     Diagnosis Date Noted    CAD (coronary artery disease) 2023     Past Medical History:   Diagnosis Date    Encounter for gynecological examination (general) (routine) without abnormal findings 2019    Encounter for gynecological examination (general) (routine) without abnormal findings 2019    HL (hearing loss)     Hyperlipidemia     Lupus     Panic disorder (episodic paroxysmal anxiety)     Personal history of other diseases of the nervous system and sense organs     Personal history of other specified conditions     Respiratory complication, postoperative     Unspecified asthma, uncomplicated ([facility])        Family History   Problem Relation Name Age of Onset    Other (cardiac disorder) Mother      Lung cancer Mother  85    Other (cardiac disorder) Father      Breast cancer Neg Hx         Past Surgical History:   Procedure Laterality Date    BACK SURGERY  10/22/2014    Back Surgery     SECTION, CLASSIC  2022     Section    OTHER SURGICAL HISTORY  06/10/2020    Tonsillectomy with  adenoidectomy    OTHER SURGICAL HISTORY  06/10/2020    Foot surgery       Social History     Tobacco Use   Smoking Status Former    Current packs/day: 0.00    Average packs/day: 1 pack/day for 30.0 years (30.0 ttl pk-yrs)    Types: Cigarettes    Start date: 1987    Quit date: 2017    Years since quittin.8    Passive exposure: Past   Smokeless Tobacco Never       Allergies  Patient has no known allergies.    Current Meds  Current Outpatient Medications   Medication Instructions    aspirin 81 mg, Daily RT    atorvastatin (Lipitor) 40 mg tablet TAKE 1 TABLET BY MOUTH EVERY DAY    doxycycline (VIBRAMYCIN) 100 mg, oral, 2 times daily, Take with at least 8 ounces (large glass) of water, do not lie down for 30 minutes after    esomeprazole (NexIUM) 20 mg DR capsule TAKE 1 CAPSULE BY MOUTH ONCE DAILY BEFORE EATING    hydroxychloroquine (Plaquenil) 200 mg tablet TAKE ONE & ONE-HALF TABLETS BY MOUTH DAILY    metoprolol succinate XL (TOPROL-XL) 50 mg, oral, Daily    sucralfate (CARAFATE) 1 g, oral, 2 times daily before meals    triamcinolone (Kenalog) 0.1 % cream APPLY AND RUB IN A THIN FILM TO AFFECTED AREAS TWICE DAILY.(AM AND PM).        Lab Results   Component Value Date    C3 115 2023    C4 27 2023    RF <10 02/10/2021    SEDRATE 4 2023    CRP 0.15 2022    URICACID 6.1 02/10/2021    DNADS <1.0 2023    CKTOTAL 177 02/10/2021             Manav Strauss MD

## 2024-11-06 ENCOUNTER — APPOINTMENT (OUTPATIENT)
Dept: AUDIOLOGY | Facility: CLINIC | Age: 59
End: 2024-11-06
Payer: MEDICARE

## 2024-11-07 ENCOUNTER — APPOINTMENT (OUTPATIENT)
Dept: AUDIOLOGY | Facility: CLINIC | Age: 59
End: 2024-11-07
Payer: MEDICARE

## 2024-11-07 DIAGNOSIS — H90.3 SENSORINEURAL HEARING LOSS, BILATERAL: Primary | ICD-10-CM

## 2024-11-07 DIAGNOSIS — H93.13 TINNITUS OF BOTH EARS: ICD-10-CM

## 2024-11-07 PROCEDURE — 92626 EVAL AUD FUNCJ 1ST HOUR: CPT | Performed by: AUDIOLOGIST

## 2024-11-07 PROCEDURE — 92604 REPROGRAM COCHLEAR IMPLT 7/>: CPT | Performed by: AUDIOLOGIST

## 2024-11-07 NOTE — PROGRESS NOTES
COCHLEAR IMPLANT SECOND STIMULATION         Name:  Christa Christie  :  1965  Age:  59 y.o.  Date of Evaluation:  2024     RIGHT DEVICE  External Device: Cochlear [id number] (N8) sound processor  Internal Device: Cochlear [employer]   Surgeon: Corrina Pacheco MD  Implantation Date: 2024  Activation Date: 2024    LEFT DEVICE  External Device: Miracle Ear NIRMAL    HISTORY  Christa Christie arrives today for the initial activation of the Cochlear [employer]  cochlear implantation (SN: [id number]) in the right cochlea used in conjunction with a Cochlear [id number] (N8) sound processor. The patient reports using P4 for several weeks. Feels like she is hearing better but difficulty on the phone. Interested in obtaining a left bilateral sequential CI.    PROGRAMMING  Headset pressure, magnet strength (3I), and incision site were checked with no problems noted. Datalogging revealed 13+ hours of use per day with 2+ hours in speech, on average since the last appointment.    Electrode impedances, used to monitor internal device function, were measured across the electrode array.  Impedance measures were within normal limits although fluctuating for all electrodes, in each stimulation mode. Current parameters of MP1+2 stimulation mode, an ACE speech processing strategy, 8 maxima, and a 900 Hz stimulation rate were maintained. Increased PW to 37 because 3 channels were out of compliance. Programming consisted of using behavioral methods. Enabled NRT measurements from surgery. Increased globally with live speech to loud but comfortable. Swept Comfort (C) levels and increased until loud but comfortable. Patient reports speech is clear and loud but comfortable.  Swept C levels with no nonauditory stimulation observed. Created progressive programs. Current programming consists of:  P1 - MAP 9 - SCAN 2  enabled WNR/NR  Standard Microphone  V: 6  S: 12  P2 - MAP 9 - ADRO+Auto S  enabled WNR/NR  Focus nany   V: 6  S: 10  P3 - MAP 9 - ADRO+Auto S  enabled WNR/NR  Adaptive nany  V: 6  S: 8   P4 - MAP 9 - ADRO+Whisper  enabled WNR/NR  Standard nany  V: 6  S: 12    FUNCTIONAL TESTING  Testing prior to programming at user settings (P1/V6/S12)    All testing was completed in the soundfield at 0 degrees azimuth. Pure tone testing was obtained using pulsed frequency modulating (FM) stimuli, and SRT was obtained using monitored live voice (MLV). Sentence and word recognition testing was performed with recorded material at 60 dBSPL.    LISTENING CONDITION Aided thresholds 250-6000 Hz CNC Words  [employer]Bio Sentences in Quiet  AzBio Sentences in +10 SNR 4-talker babble (S0N0)    Right CI only; left ear plugged 20-35 dB HL 80% 89% 46%     The Cochlear Implant Quality of Life-35 Profile (CIQOL-35 Profile) is a patient-reported outcome measure that was developed to assess the functional ability of adult cochlear users in 6 domains: Communication, Emotional, Entertainment, Environmental, Listening effort, and Social. The patient's CIQOL-10 global raw score is 37 and converted score is 56.45.    IMPRESSIONS  Positive stimulation on all active electrodes.  A reliable psychophysical MAP was created in the ACE speech processing strategy. Programming completed with behavioral methods, increased PW today to 37 due to fluctuating impedance.    Discussed programs at length as well as use of sensitivity control. Completed the device order form for her bilateral sequential CI on the left ear.    RECOMMENDATIONS  1. Continue medical follow-up with your neuro-otologist (Dr. Verdugo, Dr. Velez, Dr. Olson or Dr. Pacheco)  2. Utilize the Cochlear [employer] Nucleus Cochlear [id number] (N8) sound processor speech processor daily using the most tolerated program.   3. Return in August, 2025 for annual right CI programming, return sooner for left CI activation.   4. Utilize aural rehabilitation/auditory training programs, books on tape, and written materials  at home to improve speech understanding ability.  5. Communication strategies were discussed (utilizing visual cues/gestures; reducing background noise and distance from desired source; increasing light to assist with visual cues; use of clear speech).  6. Contact [facility] CI Team auditory-verbal therapist to initiate aural rehabilitation therapy.    VANDANA Wu, CCC-A  Senior Clinical Audiologist    TIME: [phone]

## 2024-11-20 ENCOUNTER — CLINICAL SUPPORT (OUTPATIENT)
Dept: PREADMISSION TESTING | Facility: HOSPITAL | Age: 59
End: 2024-11-20
Payer: MEDICARE

## 2024-11-20 NOTE — CPM/PAT H&P
CPM/PAT Evaluation       Name: Christa Christie (Christa Christie)  /Age: 1965/59 y.o.     {[facility] Visit Type:37607}      Chief Complaint: ***    HPI  Christa Christie is scheduled for insertion cochlear implant- left with Dr. Pacheco on 24.    Past Medical History:   Diagnosis Date    Anxiety     Coronary artery calcification seen on CT scan 2024    Coronary artery disease involving native coronary artery of native heart without angina pectoris 2024    Severe coronary calcification on CT 3/19/24    Discoid lupus     Encounter for gynecological examination (general) (routine) without abnormal findings 2019    Pap test, as part of routine gynecological examination    Encounter for gynecological examination (general) (routine) without abnormal findings 2019    Pap test, as part of routine gynecological examination    GERD (gastroesophageal reflux disease)     Hearing aid worn     HL (hearing loss)     Hyperlipidemia     Hypertension     Immunosuppression     Hydroxychloroquine    Lung nodule     Lupus     Panic disorder (episodic paroxysmal anxiety)     Panic attack    Personal history of other diseases of the nervous system and sense organs     History of tinnitus    Personal history of other specified conditions     History of heartburn    Respiratory complication, postoperative     Unspecified asthma, uncomplicated ([facility])     Asthmatic bronchitis    Upper respiratory tract infection        Past Surgical History:   Procedure Laterality Date    BACK SURGERY  10/22/2014    Back Surgery     SECTION, CLASSIC  2022     Section    COLONOSCOPY      OTHER SURGICAL HISTORY  06/10/2020    Tonsillectomy with adenoidectomy    OTHER SURGICAL HISTORY  06/10/2020    Foot surgery       Patient  has no history on file for sexual activity.    Family History   Problem Relation Name Age of Onset    Other (cardiac disorder) Mother      Lung cancer Mother  85    Other (cardiac  disorder) Father      Breast cancer Neg Hx         No Known Allergies    Prior to Admission medications    Medication Sig Start Date End Date Taking? Authorizing Provider   aspirin 81 mg EC tablet Take 1 tablet (81 mg) by mouth once daily.    Historical Provider, MD   atorvastatin (Lipitor) 40 mg tablet TAKE 1 TABLET BY MOUTH EVERY DAY 8/12/24 11/10/24  Troy Da Silva DO   esomeprazole (NexIUM) 20 mg DR capsule TAKE 1 CAPSULE BY MOUTH ONCE DAILY BEFORE EATING 4/22/24   Troy Da Silva DO   hydroxychloroquine (Plaquenil) 200 mg tablet TAKE ONE & ONE-HALF TABLETS BY MOUTH DAILY 10/7/24   Manav Strauss MD   metoprolol succinate XL (Toprol-XL) 50 mg 24 hr tablet TAKE 1 TABLET BY MOUTH EVERY DAY 7/29/24   Troy Da Silva DO   sucralfate (Carafate) 1 gram tablet TAKE 1 TABLET (1 G) BY MOUTH 2 TIMES A DAY BEFORE MEALS. 10/18/24 10/18/25  Troy Da Silva DO   triamcinolone (Kenalog) 0.1 % cream APPLY AND RUB IN A THIN FILM TO AFFECTED AREAS TWICE DAILY.(AM AND PM). 10/10/17   Historical Provider, MD SUZI ARCHER Physical Exam     Airway    Testing/Diagnostic:     -CT IAC WO IV CONTRAST; 6/5/2024   IMPRESSION:  1. Unremarkable CT of the right temporal bone.  2. Unremarkable CT study of left temporal bone.      -Exercise Stress Echo; 6/18/24  Summary:   1. The resting ejection fraction was estimated at 55 to 60% with a peak exercise ejection fraction estimated at 70 to 75%.   2. No clinical, echocardiographic or electrocardiographic evidence for ischemia at a submaximal workload.   3. Normal ECG.   4. Reduced sensitivity due to submaximal workload.   5. Submaximal level of stress achieved.      -ECG; 2/6/23  Normal sinus rhythm  ST abnormality, possible digitalis effect  Abnormal ECG  No previous ECGs available    Patient Specialist/PCP:   PCP: Troy Da Silva DO LOV 10/28/24  Rheumatology: Manav Strauss MD [facility] 11/5/24 follow up visit for discoid lupus.  Otolaryngology: Corrina Pacheco MD [facility] 9/30/24 follow up visit  being seen for sequential left-sided cochlear implant.   Cardiology: Nilesh Alberts MD L[facility] 5/8/24 seen for evaluation of preclinical coronary artery disease detected by screening chest CT with severe calcification (3/19/24).   There were no vitals taken for this visit.    DASI Risk Score    No data to display       Caprini DVT Assessment    No data to display       Modified Frailty Index    No data to display       CHADS2 Stroke Risk  Current as of 6 hours ago        N/A 3 to 100%: High Risk   2 to < 3%: Medium Risk   0 to < 2%: Low Risk     Last Change: N/A          This score determines the patient's risk of having a stroke if the patient has atrial fibrillation.        This score is not applicable to this patient. Components are not calculated.          Revised Cardiac Risk Index    No data to display       Apfel Simplified Score    No data to display       Risk Analysis Index Results This Encounter    No data found in the last 10 encounters.       Prodigy: High Risk  Total Score: 8              Prodigy Age Score           ARISCAT Score for Postoperative Pulmonary Complications    No data to display       Rand Perioperative Risk for Myocardial Infarction or Cardiac Arrest (JERI)    No data to display         Assessment and Plan:    ONLY    Kimberli Bolton RN  Pre-Admission Testing   {[facility] EMBEDDED ASSESSMENT AND PLAN:[id number]}

## 2024-11-22 DIAGNOSIS — I25.10 ATHEROSCLEROTIC HEART DISEASE OF NATIVE CORONARY ARTERY WITHOUT ANGINA PECTORIS: ICD-10-CM

## 2024-11-22 RX ORDER — ATORVASTATIN CALCIUM 40 MG/1
TABLET, FILM COATED ORAL
Qty: 90 TABLET | Refills: 1 | Status: SHIPPED | OUTPATIENT
Start: 2024-11-22 | End: 2025-02-20

## 2024-11-27 ENCOUNTER — PRE-ADMISSION TESTING (OUTPATIENT)
Dept: PREADMISSION TESTING | Facility: HOSPITAL | Age: 59
End: 2024-11-27
Payer: MEDICARE

## 2024-11-27 VITALS
OXYGEN SATURATION: 94 % | HEIGHT: 64 IN | TEMPERATURE: 98 F | BODY MASS INDEX: 27.02 KG/M2 | WEIGHT: 158.29 LBS | SYSTOLIC BLOOD PRESSURE: 133 MMHG | DIASTOLIC BLOOD PRESSURE: 80 MMHG | HEART RATE: 68 BPM

## 2024-11-27 DIAGNOSIS — Z01.818 PRE-OP EVALUATION: ICD-10-CM

## 2024-11-27 LAB
ANION GAP SERPL CALC-SCNC: 12 MMOL/L (ref 10–20)
BUN SERPL-MCNC: 13 MG/DL (ref 6–23)
CALCIUM SERPL-MCNC: 9.4 MG/DL (ref 8.6–10.6)
CHLORIDE SERPL-SCNC: 103 MMOL/L (ref 98–107)
CO2 SERPL-SCNC: 29 MMOL/L (ref 21–32)
CREAT SERPL-MCNC: 0.77 MG/DL (ref 0.5–1.05)
EGFRCR SERPLBLD CKD-EPI 2021: 88 ML/MIN/1.73M*2
ERYTHROCYTE [DISTWIDTH] IN BLOOD BY AUTOMATED COUNT: 11.3 % (ref 11.5–14.5)
GLUCOSE SERPL-MCNC: 97 MG/DL (ref 74–99)
HCT VFR BLD AUTO: 37.5 % (ref 36–46)
HGB BLD-MCNC: 12.7 G/DL (ref 12–16)
MCH RBC QN AUTO: 33.7 PG (ref 26–34)
MCHC RBC AUTO-ENTMCNC: 33.9 G/DL (ref 32–36)
MCV RBC AUTO: 100 FL (ref 80–100)
NRBC BLD-RTO: 0 /100 WBCS (ref 0–0)
PLATELET # BLD AUTO: 204 X10*3/UL (ref 150–450)
POTASSIUM SERPL-SCNC: 4.4 MMOL/L (ref 3.5–5.3)
RBC # BLD AUTO: 3.77 X10*6/UL (ref 4–5.2)
SODIUM SERPL-SCNC: 140 MMOL/L (ref 136–145)
WBC # BLD AUTO: 5 X10*3/UL (ref 4.4–11.3)

## 2024-11-27 PROCEDURE — 36415 COLL VENOUS BLD VENIPUNCTURE: CPT

## 2024-11-27 PROCEDURE — 85027 COMPLETE CBC AUTOMATED: CPT

## 2024-11-27 PROCEDURE — 80048 BASIC METABOLIC PNL TOTAL CA: CPT

## 2024-11-27 PROCEDURE — 99204 OFFICE O/P NEW MOD 45 MIN: CPT | Performed by: NURSE PRACTITIONER

## 2024-11-27 ASSESSMENT — ENCOUNTER SYMPTOMS
NEUROLOGICAL NEGATIVE: 1
ENDOCRINE NEGATIVE: 1
ARTHRALGIAS: 1
EYES NEGATIVE: 1
RESPIRATORY NEGATIVE: 1
CONSTIPATION: 1
NECK NEGATIVE: 1
CONSTITUTIONAL NEGATIVE: 1
MYALGIAS: 1
CARDIOVASCULAR NEGATIVE: 1

## 2024-11-27 ASSESSMENT — LIFESTYLE VARIABLES: SMOKING_STATUS: NONSMOKER

## 2024-11-27 ASSESSMENT — DUKE ACTIVITY SCORE INDEX (DASI)
CAN YOU DO HEAVY WORK AROUND THE HOUSE LIKE SCRUBBING FLOORS OR LIFTING AND MOVING HEAVY FURNITURE: NO
CAN YOU DO LIGHT WORK AROUND THE HOUSE LIKE DUSTING OR WASHING DISHES: YES
CAN YOU DO MODERATE WORK AROUND THE HOUSE LIKE VACUUMING, SWEEPING FLOORS OR CARRYING GROCERIES: YES
CAN YOU HAVE SEXUAL RELATIONS: YES
CAN YOU RUN A SHORT DISTANCE: NO
CAN YOU PARTICIPATE IN MODERATE RECREATIONAL ACTIVITIES LIKE GOLF, BOWLING, DANCING, DOUBLES TENNIS OR THROWING A BASEBALL OR FOOTBALL: NO
CAN YOU DO YARD WORK LIKE RAKING LEAVES, WEEDING OR PUSHING A MOWER: NO
CAN YOU PARTICIPATE IN STRENOUS SPORTS LIKE SWIMMING, SINGLES TENNIS, FOOTBALL, BASKETBALL, OR SKIING: NO
TOTAL_SCORE: 24.2
CAN YOU TAKE CARE OF YOURSELF (EAT, DRESS, BATHE, OR USE TOILET): YES
CAN YOU WALK A BLOCK OR TWO ON LEVEL GROUND: YES
CAN YOU CLIMB A FLIGHT OF STAIRS OR WALK UP A HILL: YES
DASI METS SCORE: 5.7
CAN YOU WALK INDOORS, SUCH AS AROUND YOUR HOUSE: YES

## 2024-11-27 NOTE — PREPROCEDURE INSTRUCTIONS
Fasting Guidelines    NPO Instructions:    Do not eat any food after midnight the night before your surgery/procedure.  You may have up to 13.5 ounces of clear liquids until TWO hours before your instructed arrival time to the hospital. This includes water, black tea/coffee, (no milk or cream), apple juice, and/or electrolyte drinks (Gatorade).  You may chew gum up to TWO hours before your surgery/procedure.    Additional Instructions:    Avoid herbal supplements, multivitamins and NSAIDS (non-steroidal anti-inflammatory drugs) such as Advil, Aleve, Ibuprofen, Naproxen, Excedrin, Meloxicam or Celebrex for at least 7 days prior to surgery. May take Tylenol as needed.    Avoid tobacco and alcohol products for 24 hours prior to surgery.    CONTACT SURGEON'S OFFICE IF YOU DEVELOP:  * Fever = 100.4 F   * New respiratory symptoms (e.g. cough, shortness of breath, respiratory distress, sore throat)  * Recent loss of taste or smell  *Flu like symptoms such as headache, fatigue or gastrointestinal symptoms  * You develop any open sores, shingles, burning or painful urination   AND/OR:  * You no longer wish to have the surgery.  * Any other personal circumstances change that may lead to the need to cancel or defer this surgery.  *You were admitted to any hospital within one week of your planned procedure.    Seven/Six Days before Surgery:  Review your medication instructions, stop indicated medications    Day of Surgery:  Review your medication instructions, take indicated medications  Wear comfortable loose fitting clothing  Do not use moisturizers, creams, lotions or perfume  All jewelry and valuables should be left at home    [facility] Dwyer [facility]  [facility]  [phone]  [phone]  Email-[email or web address]      Preoperative Brain Exercises    What are brain exercises?  A brain exercise is any activity that engages your thinking (cognitive) skills.    What types of  activities are considered brain exercises?  Jigsaw puzzles, crossword puzzles, word jumble, memory games, word search, and many more.  Many can be found free online or on your phone via a mobile aixa.    Why should I do brain exercises before my surgery?  More recent research has shown brain exercise before surgery can lower the risk of postoperative delirium (confusion) which can be especially important for older adults.  Patients who did brain exercises for 5 to 10 hours the days before surgery, cut their risk of postoperative delirium in half up to 1 week after surgery.         [facility]    Preoperative Deep Breathing Exercises    Why it is important to do deep breathing exercises before my surgery?  Deep breathing exercises strengthen your breathing muscles.  This helps you to recover after your surgery and decreases the chance of breathing complications.      How are the deep breathing exercises done?  Sit straight with your back supported.  Breathe in deeply and slowly through your nose. Your lower rib cage should expand and your abdomen may move forward.  Hold that breath for 3 to 5 seconds.  Breathe out through pursed lips, slowly and completely.  Rest and repeat 10 times every hour while awake.  Rest longer if you become dizzy or lightheaded.         Patient and Family Education             Ways You Can Help Prevent Blood Clots             This handout explains some simple things you can do to help prevent blood clots.      Blood clots are blockages that can form in the body's veins. When a blood clot forms in your deep veins, it may be called a deep vein thrombosis, or DVT for short. Blood clots can happen in any part of the body where blood flows, but they are most common in the arms and legs. If a piece of a blood clot breaks free and travels to the lungs, it is called a pulmonary embolus (PE). A PE can be a very serious problem.         Being in the hospital or having surgery  can raise your chances of getting a blood clot because you may not be well enough to move around as much as you normally do.         Ways you can help prevent blood clots in the hospital         Wearing SCDs. SCDs stands for Sequential Compression Devices.   SCDs are special sleeves that wrap around your legs  They attach to a pump that fills them with air to gently squeeze your legs every few minutes.   This helps return the blood in your legs to your heart.   SCDs should only be taken off when walking or bathing.   SCDs may not be comfortable, but they can help save your life.               Wearing compression stockings - if your doctor orders them. These special snug fitting stockings gently squeeze your legs to help blood flow.       Walking. Walking helps move the blood in your legs.   If your doctor says it is ok, try walking the halls at least   5 times a day. Ask us to help you get up, so you don't fall.      Taking any blood thinning medicines your doctor orders.        Page 1 of 2     [facility]; 3/23   Ways you can help prevent blood clots at home       Wearing compression stockings - if your doctor orders them. ? Walking - to help move the blood in your legs.       Taking any blood thinning medicines your doctor orders.      Signs of a blood clot or PE      Tell your doctor or nurse know right away if you have of the problems listed below.    If you are at home, seek medical care right away. Call 911 for chest pain or problems breathing.               Signs of a blood clot (DVT) - such as pain,  swelling, redness or warmth in your arm or leg      Signs of a pulmonary embolism (PE) - such as chest     pain or feeling short of breath

## 2024-11-27 NOTE — CPM/PAT H&P
CPM/PAT Evaluation       Name: Christa Christie (Christa Christie)  /Age: 1965/59 y.o.     Visit Type:   In-Person       Chief Complaint: perioperative evaluation      HPI  The patient is a 59 year old female with history of bilateral sensorineural hearing loss. She is s/p right cochlear implant without any complications. She now wishes to proceed with insertion cochlear implant-left on 24 with Dr. Pacheco.       Past Medical History:   Diagnosis Date    Anxiety     Coronary artery calcification seen on CT scan 2024    Coronary artery disease involving native coronary artery of native heart without angina pectoris 2024    Severe coronary calcification on CT 3/19/24    Discoid lupus     Encounter for gynecological examination (general) (routine) without abnormal findings 2019    Pap test, as part of routine gynecological examination    Encounter for gynecological examination (general) (routine) without abnormal findings 2019    Pap test, as part of routine gynecological examination    GERD (gastroesophageal reflux disease)     Hearing aid worn     HL (hearing loss)     Hyperlipidemia     Hypertension     Immunosuppression     Hydroxychloroquine    Joint pain     Lung nodule     Lupus     Panic disorder (episodic paroxysmal anxiety)     Panic attack    Personal history of other diseases of the nervous system and sense organs     History of tinnitus    Personal history of other specified conditions     History of heartburn    Respiratory complication, postoperative     Unspecified asthma, uncomplicated ([facility])     Asthmatic bronchitis    Upper respiratory tract infection        Past Surgical History:   Procedure Laterality Date    BACK SURGERY  10/22/2014    Back Surgery     SECTION, CLASSIC  2022     Section    COLONOSCOPY      OTHER SURGICAL HISTORY  06/10/2020    Tonsillectomy with adenoidectomy    OTHER SURGICAL HISTORY  06/10/2020    Foot surgery        Patient Sexual activity questions deferred to the physician.    Family History   Problem Relation Name Age of Onset    Other (cardiac disorder) Mother      Lung cancer Mother  85    Other (cardiac disorder) Father      Breast cancer Neg Hx         No Known Allergies    Prior to Admission medications    Medication Sig Start Date End Date Taking? Authorizing Provider   aspirin 81 mg EC tablet Take 1 tablet (81 mg) by mouth once daily.    Historical Provider, MD   atorvastatin (Lipitor) 40 mg tablet TAKE 1 TABLET BY MOUTH EVERY DAY 11/22/24 2/20/25  Troy Da Silva DO   esomeprazole (NexIUM) 20 mg DR capsule TAKE 1 CAPSULE BY MOUTH ONCE DAILY BEFORE EATING 4/22/24   Troy Da Silva DO   hydroxychloroquine (Plaquenil) 200 mg tablet TAKE ONE & ONE-HALF TABLETS BY MOUTH DAILY 10/7/24   Manav Strauss MD   metoprolol succinate XL (Toprol-XL) 50 mg 24 hr tablet TAKE 1 TABLET BY MOUTH EVERY DAY 7/29/24   Troy Da Silva DO   sucralfate (Carafate) 1 gram tablet TAKE 1 TABLET (1 G) BY MOUTH 2 TIMES A DAY BEFORE MEALS. 10/18/24 10/18/25  Troy Da Silva DO   triamcinolone (Kenalog) 0.1 % cream APPLY AND RUB IN A THIN FILM TO AFFECTED AREAS TWICE DAILY.(AM AND PM). 10/10/17   Historical Provider, MD   atorvastatin (Lipitor) 40 mg tablet TAKE 1 TABLET BY MOUTH EVERY DAY 8/12/24 11/22/24  Troy Da Silva DO        PAT ROS:   Constitutional:   neg    Neuro/Psych:    Wrist carpal tunnel, left  neg    Eyes:   neg     use of corrective lenses  Ears:    Right cochlear implant   hearing loss  Nose:   neg    Mouth:   neg    Throat:   neg    Neck:   neg    Cardio:   neg    Respiratory:   neg    Endocrine:   neg    GI:    constipation (intermittent, currently using probiotic)  :   neg    Musculoskeletal:    arthralgias   myalgias  Hematologic:   neg    Skin:  neg        Physical Exam  Vitals reviewed.   Constitutional:       Appearance: Normal appearance.   HENT:      Head: Normocephalic and atraumatic.      Nose: Nose normal.       "Mouth/Throat:      Mouth: Mucous membranes are moist.      Pharynx: Oropharynx is clear.   Eyes:      Extraocular Movements: Extraocular movements intact.      Conjunctiva/sclera: Conjunctivae normal.      Pupils: Pupils are equal, round, and reactive to light.   Cardiovascular:      Rate and Rhythm: Normal rate and regular rhythm.      Pulses: Normal pulses.      Heart sounds: Normal heart sounds.   Pulmonary:      Effort: Pulmonary effort is normal.      Breath sounds: Normal breath sounds.   Musculoskeletal:         General: Normal range of motion.      Cervical back: Normal range of motion.   Skin:     General: Skin is warm and dry.   Neurological:      General: No focal deficit present.      Mental Status: She is alert and oriented to person, place, and time.   Psychiatric:         Mood and Affect: Mood normal.         Behavior: Behavior normal.          PAT AIRWAY:   Airway:     Mallampati::  IV    TM distance::  >3 FB    Neck ROM::  Full  normal          Visit Vitals  /80   Pulse 68   Temp 36.7 °C (98 °F)   Ht 1.626 m (5' 4\")   Wt 71.8 kg (158 lb 4.6 oz)   SpO2 94%   BMI 27.17 kg/m²   OB Status Postmenopausal   Smoking Status Former   BSA 1.8 m²          DASI Risk Score      Flowsheet Row Pre-Admission Testing from 11/27/2024 in [facility]   Can you take care of yourself (eat, dress, bathe, or use toilet)?  2.75 filed at 11/27/2024 1105   Can you walk indoors, such as around your house? 1.75 filed at 11/27/2024 1105   Can you walk a block or two on level ground?  2.75 filed at 11/27/2024 1105   Can you climb a flight of stairs or walk up a hill? 5.5 filed at 11/27/2024 1105   Can you run a short distance? 0 filed at 11/27/2024 1105   Can you do light work around the house like dusting or washing dishes? 2.7 filed at 11/27/2024 1105   Can you do moderate work around the house like vacuuming, sweeping floors or carrying groceries? 3.5 filed at 11/27/2024 1105   Can you do heavy work " around the house like scrubbing floors or lifting and moving heavy furniture?  0 filed at 11/27/2024 1105   Can you do yard work like raking leaves, weeding or pushing a mower? 0 filed at 11/27/2024 1105   Can you have sexual relations? 5.25 filed at 11/27/2024 1105   Can you participate in moderate recreational activities like golf, bowling, dancing, doubles tennis or throwing a baseball or football? 0 filed at 11/27/2024 1105   Can you participate in strenous sports like swimming, singles tennis, football, basketball, or skiing? 0 filed at 11/27/2024 1105   DASI SCORE 24.2 filed at 11/27/2024 1105   METS Score (Will be calculated only when all the questions are answered) 5.7 filed at 11/27/2024 1105          Caprini DVT Assessment      Flowsheet Row Pre-Admission Testing from 11/27/2024 in [facility]   DVT Score 5 filed at 11/27/2024 1119   Surgical Factors Major surgery planned, including arthroscopic and laproscopic (1-2 hours) filed at 11/27/2024 1119   BMI 30 or less filed at 11/27/2024 1119          Modified Frailty Index      Flowsheet Row Pre-Admission Testing from 11/27/2024 in [facility]   Non-independent functional status (problems with dressing, bathing, personal grooming, or cooking) 0 filed at 11/27/2024 1119   History of diabetes mellitus  0 filed at 11/27/2024 1119   History of COPD 0 filed at 11/27/2024 1119   History of CHF No filed at 11/27/2024 1119   History of MI 0 filed at 11/27/2024 1119   History of Percutaneous Coronary Intervention, Cardiac Surgery, or Angina No filed at 11/27/2024 1119   Hypertension requiring the use of medication  0.0909 filed at 11/27/2024 1119   Peripheral vascular disease 0 filed at 11/27/2024 1119   Impaired sensorium (cognitive impairement or loss, clouding, or delirium) 0 filed at 11/27/2024 1119   TIA or CVA withouy residual deficit 0 filed at 11/27/2024 1119   Cerebrovascular accident with deficit 0 filed at 11/27/2024 1119    Modified Frailty Index Calculator .0909 filed at 11/27/2024 1119          CHADS2 Stroke Risk  Current as of today        N/A 3 to 100%: High Risk   2 to < 3%: Medium Risk   0 to < 2%: Low Risk     Last Change: N/A          This score determines the patient's risk of having a stroke if the patient has atrial fibrillation.        This score is not applicable to this patient. Components are not calculated.          Revised Cardiac Risk Index      Flowsheet Row Pre-Admission Testing from 11/27/2024 in [facility]   High-Risk Surgery (Intraperitoneal, Intrathoracic,Suprainguinal vascular) 0 filed at 11/27/2024 1119   History of ischemic heart disease (History of MI, History of positive exercuse test, Current chest paint considered due to myocardial ischemia, Use of nitrate therapy, ECG with pathological Q Waves) 1 filed at 11/27/2024 1119   History of congestive heart failure (pulmonary edemia, bilateral rales or S3 gallop, Paroxysmal nocturnal dyspnea, CXR showing pulmonary vascular redistribution) 0 filed at 11/27/2024 1119   History of cerebrovascular disease (Prior TIA or stroke) 0 filed at 11/27/2024 1119   Pre-operative insulin treatment 0 filed at 11/27/2024 1119   Pre-operative creatinine>2 mg/dl 0 filed at 11/27/2024 1119   Revised Cardiac Risk Calculator 1 filed at 11/27/2024 1119          Apfel Simplified Score      Flowsheet Row Pre-Admission Testing from 11/27/2024 in [facility]   Smoking status 1 filed at 11/27/2024 1119   History of motion sickness or PONV  0 filed at 11/27/2024 1119   Use of postoperative opioids 1 filed at 11/27/2024 1119   Gender - Female 1=Yes filed at 11/27/2024 1119   Apfel Simplified Score Calculator 3 filed at 11/27/2024 1119          Risk Analysis Index Results This Encounter    No data found in the last 10 encounters.       Stop Bang Score      Flowsheet Row Pre-Admission Testing from 11/27/2024 in [facility]   Do you snore  loudly? 0 filed at 11/27/2024 1104   Do you often feel tired or fatigued after your sleep? 0 filed at 11/27/2024 1104   Has anyone ever observed you stop breathing in your sleep? 0 filed at 11/27/2024 1104   Do you have or are you being treated for high blood pressure? 1 filed at 11/27/2024 1104   Recent BMI (Calculated) 28.5 filed at 11/27/2024 1104   Is BMI greater than 35 kg/m2? 0=No filed at 11/27/2024 1104   Age older than 50 years old? 1=Yes filed at 11/27/2024 1104   Is your neck circumference greater than 17 inches (Male) or 16 inches (Female)? 0 filed at 11/27/2024 1104   Gender - Male 0=No filed at 11/27/2024 1104   STOP-BANG Total Score 2 filed at 11/27/2024 1104          Prodigy: High Risk  Total Score: 8              Prodigy Age Score           ARISCAT Score for Postoperative Pulmonary Complications    No data to display       Rand Perioperative Risk for Myocardial Infarction or Cardiac Arrest (JERI)    No data to display       Recent Results (from the past 2 weeks)   CBC    Collection Time: 11/27/24 11:30 AM   Result Value Ref Range    WBC 5.0 4.4 - 11.3 x10*3/uL    nRBC 0.0 0.0 - 0.0 /100 WBCs    RBC 3.77 (L) 4.00 - 5.20 x10*6/uL    Hemoglobin 12.7 12.0 - 16.0 g/dL    Hematocrit 37.5 36.0 - 46.0 %     80 - 100 fL    MCH 33.7 26.0 - 34.0 pg    MCHC 33.9 32.0 - 36.0 g/dL    RDW 11.3 (L) 11.5 - 14.5 %    Platelets 204 150 - 450 x10*3/uL   Basic Metabolic Panel    Collection Time: 11/27/24 11:30 AM   Result Value Ref Range    Glucose 97 74 - 99 mg/dL    Sodium 140 136 - 145 mmol/L    Potassium 4.4 3.5 - 5.3 mmol/L    Chloride 103 98 - 107 mmol/L    Bicarbonate 29 21 - 32 mmol/L    Anion Gap 12 10 - 20 mmol/L    Urea Nitrogen 13 6 - 23 mg/dL    Creatinine 0.77 0.50 - 1.05 mg/dL    eGFR 88 >60 mL/min/1.73m*2    Calcium 9.4 8.6 - 10.6 mg/dL        Diagnostic Results      -CT IAC WO IV CONTRAST; 6/5/2024   IMPRESSION:  1. Unremarkable CT of the right temporal bone.  2. Unremarkable CT study of left  temporal bone.     -Exercise Stress Echo; 6/18/24  Summary:   1. The resting ejection fraction was estimated at 55 to 60% with a peak exercise ejection fraction estimated at 70 to 75%.   2. No clinical, echocardiographic or electrocardiographic evidence for ischemia at a submaximal workload.   3. Normal ECG.   4. Reduced sensitivity due to submaximal workload.   5. Submaximal level of stress achieved.    -ECG; 2/6/23  Normal sinus rhythm  ST abnormality, possible digitalis effect  Abnormal ECG  No previous ECGs available    Assessment and Plan:     Anesthesia  The patient denies problems with anesthesia in the past such as PONV, prolonged sedation, awareness, dental damage, aspiration, cardiac arrest, difficult intubation, or unexpected hospital admissions.     Cardiovascular  #HTN  -managed on metoprolol, instructed to continue as prescribed in the perioperative period. BP today 133/80  #HLD  -managed on aspirin and statin, instructed to continue as prescribed in the perioperative period  #CAD  -severe coronary artery calcifications noted on CT 3/2024. She is s/p negative exercise stress echo 6/18/24. She was seen 5/8/24 by Dr. Nilesh Alberts. Currently on aspirin and statin therapy as above    She is scheduled for non-cardiac surgery associated with elevated risk. The patient has no major cardiac contraindications to non- cardiac surgery.  METS  The patient's functional capacity capacity is greater than 4 METS.  RCRI  The patient meets 1 RCRI criteria and therefore has a 6% risk of major adverse cardiac complications.  JERI score which indicates a 0.1% risk of intraoperative or 30-day postoperative MACE (major adverse cardiac event).    Pulmonary  #Asthma  -in childhood, no longer requires inhalers. Viral URI one week ago with resolving symptoms. Currently denies fever, chills, sob, cough or wheezing.   The patient is at increased risk of perioperative pulmonary complications secondary to advanced age greater than  60.    STOP BANG 2, which places patient at low risk for having JEREMY.    ARISCAT 3, low, 1.6% risk of in-hospital postoperative pulmonary complications  PRODIGY 8, intermediate risk of respiratory depression episode.     Patient given PI sheet for preoperative deep breathing exercises.  Encourage  incentive spirometry in the postoperative period as deemed necessary.    Neurology  The patient has no neurological diagnoses or significant findings on chart review, clinical presentation, and evaluation. No grossly apparent neurological perioperative risk. The patient is at increased risk for perioperative stroke secondary to hypertension , hyperlipidemia, female gender, general anesthesia. Handouts for preoperative brain exercises given to patient.    HEENT/Airway  #Hearing loss  -scheduled for cochlear implant 12/9/24 with Dr. Pacheco  No diagnoses, significant findings on chart review, clinical presentation, or evaluation. The patient is at increased risk of airway difficulty secondary to prior neck surgery, fusion/fixation. No documented or reported history of airway difficulty.     Endocrine  No diagnoses or significant findings on chart review or clinical presentation and evaluation.    Gastrointestinal  #GERD  -managed with medication, continue as prescribed in the perioperative period. No current GI complaints.    Eat 10- 0,  self-perceived oropharyngeal dysphagia scale (0-40)     Genitourinary  No diagnoses or significant findings on chart review or clinical presentation and evaluation.    Renal  No renal diagnoses or significant findings on chart review or clinical presentation and evaluation. The patient has specific risk factors associated with increased risk of perioperative renal complications related to age greater than 55, hypertension. Preventative measures include preoperative hydration.    Hematology  No diagnoses or significant findings on chart review or clinical presentation and evaluation.    Caprini  score 5, intermediate risk of perioperative VTE.     Patient instructed to ambulate as soon as possible postoperatively to decrease thromboembolic risk. Initiate mechanical DVT prophylaxis as soon as possible and initiate chemical prophylaxis when deemed safe from a bleeding standpoint post surgery.     Transfusion Evaluation  T&S not obtained. Low likelihood for perioperative transfusion of blood or blood products.    Musculoskeletal  #Lupus  -managed on plaquenil, instructed to continue as prescribed in the perioperative period    Rheumatology: Manav Strauss MD LOV 11/5/24 follow up visit for discoid lupus.     ID  No diagnoses or significant findings on chart review or clinical presentation and evaluation.    -Preoperative medication instructions were provided and reviewed with the patient.  Any additional testing or evaluation was explained to the patient.  NPO Instructions were discussed, and the patient's questions were answered prior to conclusion of this encounter. Patient verbalized understanding of preoperative instructions. After Visit Summary given.

## 2024-11-27 NOTE — H&P (VIEW-ONLY)
CPM/PAT Evaluation       Name: Christa Christie (Christa Christie)  /Age: 1965/59 y.o.     Visit Type:   In-Person       Chief Complaint: perioperative evaluation      HPI  The patient is a 59 year old female with history of bilateral sensorineural hearing loss. She is s/p right cochlear implant without any complications. She now wishes to proceed with insertion cochlear implant-left on 24 with Dr. Pacheco.       Past Medical History:   Diagnosis Date    Anxiety     Coronary artery calcification seen on CT scan 2024    Coronary artery disease involving native coronary artery of native heart without angina pectoris 2024    Severe coronary calcification on CT 3/19/24    Discoid lupus     Encounter for gynecological examination (general) (routine) without abnormal findings 2019    Pap test, as part of routine gynecological examination    Encounter for gynecological examination (general) (routine) without abnormal findings 2019    Pap test, as part of routine gynecological examination    GERD (gastroesophageal reflux disease)     Hearing aid worn     HL (hearing loss)     Hyperlipidemia     Hypertension     Immunosuppression     Hydroxychloroquine    Joint pain     Lung nodule     Lupus     Panic disorder (episodic paroxysmal anxiety)     Panic attack    Personal history of other diseases of the nervous system and sense organs     History of tinnitus    Personal history of other specified conditions     History of heartburn    Respiratory complication, postoperative     Unspecified asthma, uncomplicated ([facility])     Asthmatic bronchitis    Upper respiratory tract infection        Past Surgical History:   Procedure Laterality Date    BACK SURGERY  10/22/2014    Back Surgery     SECTION, CLASSIC  2022     Section    COLONOSCOPY      OTHER SURGICAL HISTORY  06/10/2020    Tonsillectomy with adenoidectomy    OTHER SURGICAL HISTORY  06/10/2020    Foot surgery        Patient Sexual activity questions deferred to the physician.    Family History   Problem Relation Name Age of Onset    Other (cardiac disorder) Mother      Lung cancer Mother  85    Other (cardiac disorder) Father      Breast cancer Neg Hx         No Known Allergies    Prior to Admission medications    Medication Sig Start Date End Date Taking? Authorizing Provider   aspirin 81 mg EC tablet Take 1 tablet (81 mg) by mouth once daily.    Historical Provider, MD   atorvastatin (Lipitor) 40 mg tablet TAKE 1 TABLET BY MOUTH EVERY DAY 11/22/24 2/20/25  Troy Da Silva DO   esomeprazole (NexIUM) 20 mg DR capsule TAKE 1 CAPSULE BY MOUTH ONCE DAILY BEFORE EATING 4/22/24   Troy Da Silva DO   hydroxychloroquine (Plaquenil) 200 mg tablet TAKE ONE & ONE-HALF TABLETS BY MOUTH DAILY 10/7/24   Manav Strauss MD   metoprolol succinate XL (Toprol-XL) 50 mg 24 hr tablet TAKE 1 TABLET BY MOUTH EVERY DAY 7/29/24   Troy Da Silva DO   sucralfate (Carafate) 1 gram tablet TAKE 1 TABLET (1 G) BY MOUTH 2 TIMES A DAY BEFORE MEALS. 10/18/24 10/18/25  Troy Da Silva DO   triamcinolone (Kenalog) 0.1 % cream APPLY AND RUB IN A THIN FILM TO AFFECTED AREAS TWICE DAILY.(AM AND PM). 10/10/17   Historical Provider, MD   atorvastatin (Lipitor) 40 mg tablet TAKE 1 TABLET BY MOUTH EVERY DAY 8/12/24 11/22/24  Troy Da Silva DO        PAT ROS:   Constitutional:   neg    Neuro/Psych:    Wrist carpal tunnel, left  neg    Eyes:   neg     use of corrective lenses  Ears:    Right cochlear implant   hearing loss  Nose:   neg    Mouth:   neg    Throat:   neg    Neck:   neg    Cardio:   neg    Respiratory:   neg    Endocrine:   neg    GI:    constipation (intermittent, currently using probiotic)  :   neg    Musculoskeletal:    arthralgias   myalgias  Hematologic:   neg    Skin:  neg        Physical Exam  Vitals reviewed.   Constitutional:       Appearance: Normal appearance.   HENT:      Head: Normocephalic and atraumatic.      Nose: Nose normal.       "Mouth/Throat:      Mouth: Mucous membranes are moist.      Pharynx: Oropharynx is clear.   Eyes:      Extraocular Movements: Extraocular movements intact.      Conjunctiva/sclera: Conjunctivae normal.      Pupils: Pupils are equal, round, and reactive to light.   Cardiovascular:      Rate and Rhythm: Normal rate and regular rhythm.      Pulses: Normal pulses.      Heart sounds: Normal heart sounds.   Pulmonary:      Effort: Pulmonary effort is normal.      Breath sounds: Normal breath sounds.   Musculoskeletal:         General: Normal range of motion.      Cervical back: Normal range of motion.   Skin:     General: Skin is warm and dry.   Neurological:      General: No focal deficit present.      Mental Status: She is alert and oriented to person, place, and time.   Psychiatric:         Mood and Affect: Mood normal.         Behavior: Behavior normal.          PAT AIRWAY:   Airway:     Mallampati::  IV    TM distance::  >3 FB    Neck ROM::  Full  normal          Visit Vitals  /80   Pulse 68   Temp 36.7 °C (98 °F)   Ht 1.626 m (5' 4\")   Wt 71.8 kg (158 lb 4.6 oz)   SpO2 94%   BMI 27.17 kg/m²   OB Status Postmenopausal   Smoking Status Former   BSA 1.8 m²          DASI Risk Score      Flowsheet Row Pre-Admission Testing from 11/27/2024 in [facility]   Can you take care of yourself (eat, dress, bathe, or use toilet)?  2.75 filed at 11/27/2024 1105   Can you walk indoors, such as around your house? 1.75 filed at 11/27/2024 1105   Can you walk a block or two on level ground?  2.75 filed at 11/27/2024 1105   Can you climb a flight of stairs or walk up a hill? 5.5 filed at 11/27/2024 1105   Can you run a short distance? 0 filed at 11/27/2024 1105   Can you do light work around the house like dusting or washing dishes? 2.7 filed at 11/27/2024 1105   Can you do moderate work around the house like vacuuming, sweeping floors or carrying groceries? 3.5 filed at 11/27/2024 1105   Can you do heavy work " around the house like scrubbing floors or lifting and moving heavy furniture?  0 filed at 11/27/2024 1105   Can you do yard work like raking leaves, weeding or pushing a mower? 0 filed at 11/27/2024 1105   Can you have sexual relations? 5.25 filed at 11/27/2024 1105   Can you participate in moderate recreational activities like golf, bowling, dancing, doubles tennis or throwing a baseball or football? 0 filed at 11/27/2024 1105   Can you participate in strenous sports like swimming, singles tennis, football, basketball, or skiing? 0 filed at 11/27/2024 1105   DASI SCORE 24.2 filed at 11/27/2024 1105   METS Score (Will be calculated only when all the questions are answered) 5.7 filed at 11/27/2024 1105          Caprini DVT Assessment      Flowsheet Row Pre-Admission Testing from 11/27/2024 in [facility]   DVT Score 5 filed at 11/27/2024 1119   Surgical Factors Major surgery planned, including arthroscopic and laproscopic (1-2 hours) filed at 11/27/2024 1119   BMI 30 or less filed at 11/27/2024 1119          Modified Frailty Index      Flowsheet Row Pre-Admission Testing from 11/27/2024 in [facility]   Non-independent functional status (problems with dressing, bathing, personal grooming, or cooking) 0 filed at 11/27/2024 1119   History of diabetes mellitus  0 filed at 11/27/2024 1119   History of COPD 0 filed at 11/27/2024 1119   History of CHF No filed at 11/27/2024 1119   History of MI 0 filed at 11/27/2024 1119   History of Percutaneous Coronary Intervention, Cardiac Surgery, or Angina No filed at 11/27/2024 1119   Hypertension requiring the use of medication  0.0909 filed at 11/27/2024 1119   Peripheral vascular disease 0 filed at 11/27/2024 1119   Impaired sensorium (cognitive impairement or loss, clouding, or delirium) 0 filed at 11/27/2024 1119   TIA or CVA withouy residual deficit 0 filed at 11/27/2024 1119   Cerebrovascular accident with deficit 0 filed at 11/27/2024 1119    Modified Frailty Index Calculator .0909 filed at 11/27/2024 1119          CHADS2 Stroke Risk  Current as of today        N/A 3 to 100%: High Risk   2 to < 3%: Medium Risk   0 to < 2%: Low Risk     Last Change: N/A          This score determines the patient's risk of having a stroke if the patient has atrial fibrillation.        This score is not applicable to this patient. Components are not calculated.          Revised Cardiac Risk Index      Flowsheet Row Pre-Admission Testing from 11/27/2024 in [facility] [place]   High-Risk Surgery (Intraperitoneal, Intrathoracic,Suprainguinal vascular) 0 filed at 11/27/2024 1119   History of ischemic heart disease (History of MI, History of positive exercuse test, Current chest paint considered due to myocardial ischemia, Use of nitrate therapy, ECG with pathological Q Waves) 1 filed at 11/27/2024 1119   History of congestive heart failure (pulmonary edemia, bilateral rales or S3 gallop, Paroxysmal nocturnal dyspnea, CXR showing pulmonary vascular redistribution) 0 filed at 11/27/2024 1119   History of cerebrovascular disease (Prior TIA or stroke) 0 filed at 11/27/2024 1119   Pre-operative insulin treatment 0 filed at 11/27/2024 1119   Pre-operative creatinine>2 mg/dl 0 filed at 11/27/2024 1119   Revised Cardiac Risk Calculator 1 filed at 11/27/2024 1119          Apfel Simplified Score      Flowsheet Row Pre-Admission Testing from 11/27/2024 in [facility] [place]   Smoking status 1 filed at 11/27/2024 1119   History of motion sickness or PONV  0 filed at 11/27/2024 1119   Use of postoperative opioids 1 filed at 11/27/2024 1119   Gender - Female 1=Yes filed at 11/27/2024 1119   Apfel Simplified Score Calculator 3 filed at 11/27/2024 1119          Risk Analysis Index Results This Encounter    No data found in the last 10 encounters.       Stop Bang Score      Flowsheet Row Pre-Admission Testing from 11/27/2024 in [facility] [place]   Do you snore  loudly? 0 filed at 11/27/2024 1104   Do you often feel tired or fatigued after your sleep? 0 filed at 11/27/2024 1104   Has anyone ever observed you stop breathing in your sleep? 0 filed at 11/27/2024 1104   Do you have or are you being treated for high blood pressure? 1 filed at 11/27/2024 1104   Recent BMI (Calculated) 28.5 filed at 11/27/2024 1104   Is BMI greater than 35 kg/m2? 0=No filed at 11/27/2024 1104   Age older than 50 years old? 1=Yes filed at 11/27/2024 1104   Is your neck circumference greater than 17 inches (Male) or 16 inches (Female)? 0 filed at 11/27/2024 1104   Gender - Male 0=No filed at 11/27/2024 1104   STOP-BANG Total Score 2 filed at 11/27/2024 1104          Prodigy: High Risk  Total Score: 8              Prodigy Age Score           ARISCAT Score for Postoperative Pulmonary Complications    No data to display       Rand Perioperative Risk for Myocardial Infarction or Cardiac Arrest (JERI)    No data to display       Recent Results (from the past 2 weeks)   CBC    Collection Time: 11/27/24 11:30 AM   Result Value Ref Range    WBC 5.0 4.4 - 11.3 x10*3/uL    nRBC 0.0 0.0 - 0.0 /100 WBCs    RBC 3.77 (L) 4.00 - 5.20 x10*6/uL    Hemoglobin 12.7 12.0 - 16.0 g/dL    Hematocrit 37.5 36.0 - 46.0 %     80 - 100 fL    MCH 33.7 26.0 - 34.0 pg    MCHC 33.9 32.0 - 36.0 g/dL    RDW 11.3 (L) 11.5 - 14.5 %    Platelets 204 150 - 450 x10*3/uL   Basic Metabolic Panel    Collection Time: 11/27/24 11:30 AM   Result Value Ref Range    Glucose 97 74 - 99 mg/dL    Sodium 140 136 - 145 mmol/L    Potassium 4.4 3.5 - 5.3 mmol/L    Chloride 103 98 - 107 mmol/L    Bicarbonate 29 21 - 32 mmol/L    Anion Gap 12 10 - 20 mmol/L    Urea Nitrogen 13 6 - 23 mg/dL    Creatinine 0.77 0.50 - 1.05 mg/dL    eGFR 88 >60 mL/min/1.73m*2    Calcium 9.4 8.6 - 10.6 mg/dL        Diagnostic Results      -CT IAC WO IV CONTRAST; 6/5/2024   IMPRESSION:  1. Unremarkable CT of the right temporal bone.  2. Unremarkable CT study of left  temporal bone.     -Exercise Stress Echo; 6/18/24  Summary:   1. The resting ejection fraction was estimated at 55 to 60% with a peak exercise ejection fraction estimated at 70 to 75%.   2. No clinical, echocardiographic or electrocardiographic evidence for ischemia at a submaximal workload.   3. Normal ECG.   4. Reduced sensitivity due to submaximal workload.   5. Submaximal level of stress achieved.    -ECG; 2/6/23  Normal sinus rhythm  ST abnormality, possible digitalis effect  Abnormal ECG  No previous ECGs available    Assessment and Plan:     Anesthesia  The patient denies problems with anesthesia in the past such as PONV, prolonged sedation, awareness, dental damage, aspiration, cardiac arrest, difficult intubation, or unexpected hospital admissions.     Cardiovascular  #HTN  -managed on metoprolol, instructed to continue as prescribed in the perioperative period. BP today 133/80  #HLD  -managed on aspirin and statin, instructed to continue as prescribed in the perioperative period  #CAD  -severe coronary artery calcifications noted on CT 3/2024. She is s/p negative exercise stress echo 6/18/24. She was seen 5/8/24 by Dr. Nilesh Alberts. Currently on aspirin and statin therapy as above    She is scheduled for non-cardiac surgery associated with elevated risk. The patient has no major cardiac contraindications to non- cardiac surgery.  METS  The patient's functional capacity capacity is greater than 4 METS.  RCRI  The patient meets 1 RCRI criteria and therefore has a 6% risk of major adverse cardiac complications.  JERI score which indicates a 0.1% risk of intraoperative or 30-day postoperative MACE (major adverse cardiac event).    Pulmonary  #Asthma  -in childhood, no longer requires inhalers. Viral URI one week ago with resolving symptoms. Currently denies fever, chills, sob, cough or wheezing.   The patient is at increased risk of perioperative pulmonary complications secondary to advanced age greater than  60.    STOP BANG 2, which places patient at low risk for having JEREMY.    ARISCAT 3, low, 1.6% risk of in-hospital postoperative pulmonary complications  PRODIGY 8, intermediate risk of respiratory depression episode.     Patient given PI sheet for preoperative deep breathing exercises.  Encourage  incentive spirometry in the postoperative period as deemed necessary.    Neurology  The patient has no neurological diagnoses or significant findings on chart review, clinical presentation, and evaluation. No grossly apparent neurological perioperative risk. The patient is at increased risk for perioperative stroke secondary to hypertension , hyperlipidemia, female gender, general anesthesia. Handouts for preoperative brain exercises given to patient.    HEENT/Airway  #Hearing loss  -scheduled for cochlear implant 12/9/24 with Dr. Pacheco  No diagnoses, significant findings on chart review, clinical presentation, or evaluation. The patient is at increased risk of airway difficulty secondary to prior neck surgery, fusion/fixation. No documented or reported history of airway difficulty.     Endocrine  No diagnoses or significant findings on chart review or clinical presentation and evaluation.    Gastrointestinal  #GERD  -managed with medication, continue as prescribed in the perioperative period. No current GI complaints.    Eat 10- 0,  self-perceived oropharyngeal dysphagia scale (0-40)     Genitourinary  No diagnoses or significant findings on chart review or clinical presentation and evaluation.    Renal  No renal diagnoses or significant findings on chart review or clinical presentation and evaluation. The patient has specific risk factors associated with increased risk of perioperative renal complications related to age greater than 55, hypertension. Preventative measures include preoperative hydration.    Hematology  No diagnoses or significant findings on chart review or clinical presentation and evaluation.    Caprini  score 5, intermediate risk of perioperative VTE.     Patient instructed to ambulate as soon as possible postoperatively to decrease thromboembolic risk. Initiate mechanical DVT prophylaxis as soon as possible and initiate chemical prophylaxis when deemed safe from a bleeding standpoint post surgery.     Transfusion Evaluation  T&S not obtained. Low likelihood for perioperative transfusion of blood or blood products.    Musculoskeletal  #Lupus  -managed on plaquenil, instructed to continue as prescribed in the perioperative period    Rheumatology: Manav Strauss MD LOV 11/5/24 follow up visit for discoid lupus.     ID  No diagnoses or significant findings on chart review or clinical presentation and evaluation.    -Preoperative medication instructions were provided and reviewed with the patient.  Any additional testing or evaluation was explained to the patient.  NPO Instructions were discussed, and the patient's questions were answered prior to conclusion of this encounter. Patient verbalized understanding of preoperative instructions. After Visit Summary given.

## 2024-12-06 ENCOUNTER — ANESTHESIA EVENT (OUTPATIENT)
Dept: OPERATING ROOM | Facility: HOSPITAL | Age: 59
End: 2024-12-06
Payer: MEDICARE

## 2024-12-09 ENCOUNTER — HOSPITAL ENCOUNTER (OUTPATIENT)
Facility: HOSPITAL | Age: 59
Setting detail: OUTPATIENT SURGERY
Discharge: HOME | End: 2024-12-09
Attending: OTOLARYNGOLOGY | Admitting: OTOLARYNGOLOGY
Payer: MEDICARE

## 2024-12-09 ENCOUNTER — ANESTHESIA (OUTPATIENT)
Dept: OPERATING ROOM | Facility: HOSPITAL | Age: 59
End: 2024-12-09
Payer: MEDICARE

## 2024-12-09 VITALS
DIASTOLIC BLOOD PRESSURE: 71 MMHG | OXYGEN SATURATION: 93 % | SYSTOLIC BLOOD PRESSURE: 123 MMHG | TEMPERATURE: 97.2 F | HEART RATE: 75 BPM | RESPIRATION RATE: 14 BRPM | BODY MASS INDEX: 26.84 KG/M2 | WEIGHT: 157.19 LBS | HEIGHT: 64 IN

## 2024-12-09 DIAGNOSIS — G89.18 POST-OP PAIN: Primary | ICD-10-CM

## 2024-12-09 DIAGNOSIS — H90.3 SENSORY HEARING LOSS, BILATERAL: ICD-10-CM

## 2024-12-09 PROCEDURE — 2500000002 HC RX 250 W HCPCS SELF ADMINISTERED DRUGS (ALT 637 FOR MEDICARE OP, ALT 636 FOR OP/ED): Performed by: ANESTHESIOLOGY

## 2024-12-09 PROCEDURE — 2500000005 HC RX 250 GENERAL PHARMACY W/O HCPCS: Performed by: ANESTHESIOLOGY

## 2024-12-09 PROCEDURE — 2500000004 HC RX 250 GENERAL PHARMACY W/ HCPCS (ALT 636 FOR OP/ED): Performed by: OTOLARYNGOLOGY

## 2024-12-09 PROCEDURE — 2500000001 HC RX 250 WO HCPCS SELF ADMINISTERED DRUGS (ALT 637 FOR MEDICARE OP): Performed by: ANESTHESIOLOGY

## 2024-12-09 PROCEDURE — 2500000005 HC RX 250 GENERAL PHARMACY W/O HCPCS: Performed by: OTOLARYNGOLOGY

## 2024-12-09 PROCEDURE — 7100000002 HC RECOVERY ROOM TIME - EACH INCREMENTAL 1 MINUTE: Performed by: OTOLARYNGOLOGY

## 2024-12-09 PROCEDURE — 7100000009 HC PHASE TWO TIME - INITIAL BASE CHARGE: Performed by: OTOLARYNGOLOGY

## 2024-12-09 PROCEDURE — L8614 COCHLEAR DEVICE: HCPCS | Performed by: OTOLARYNGOLOGY

## 2024-12-09 PROCEDURE — 7100000001 HC RECOVERY ROOM TIME - INITIAL BASE CHARGE: Performed by: OTOLARYNGOLOGY

## 2024-12-09 PROCEDURE — 2780000003 HC OR 278 NO HCPCS: Performed by: OTOLARYNGOLOGY

## 2024-12-09 PROCEDURE — 3700000002 HC GENERAL ANESTHESIA TIME - EACH INCREMENTAL 1 MINUTE: Performed by: OTOLARYNGOLOGY

## 2024-12-09 PROCEDURE — 3600000009 HC OR TIME - EACH INCREMENTAL 1 MINUTE - PROCEDURE LEVEL FOUR: Performed by: OTOLARYNGOLOGY

## 2024-12-09 PROCEDURE — A69930 PR IMPLANT COCHLEAR DEVICE: Performed by: ANESTHESIOLOGY

## 2024-12-09 PROCEDURE — 3600000004 HC OR TIME - INITIAL BASE CHARGE - PROCEDURE LEVEL FOUR: Performed by: OTOLARYNGOLOGY

## 2024-12-09 PROCEDURE — 7100000010 HC PHASE TWO TIME - EACH INCREMENTAL 1 MINUTE: Performed by: OTOLARYNGOLOGY

## 2024-12-09 PROCEDURE — 3700000001 HC GENERAL ANESTHESIA TIME - INITIAL BASE CHARGE: Performed by: OTOLARYNGOLOGY

## 2024-12-09 PROCEDURE — 2500000004 HC RX 250 GENERAL PHARMACY W/ HCPCS (ALT 636 FOR OP/ED)

## 2024-12-09 PROCEDURE — 69930 IMPLANT COCHLEAR DEVICE: CPT | Performed by: OTOLARYNGOLOGY

## 2024-12-09 PROCEDURE — 2720000007 HC OR 272 NO HCPCS: Performed by: OTOLARYNGOLOGY

## 2024-12-09 PROCEDURE — C1729 CATH, DRAINAGE: HCPCS | Performed by: OTOLARYNGOLOGY

## 2024-12-09 DEVICE — COCHLEAR, NUCLEUS CI632, PROFILE PLUS W/SLIM MODIOLAR ELECTRODE: Type: IMPLANTABLE DEVICE | Site: COCHLEA | Status: FUNCTIONAL

## 2024-12-09 RX ORDER — ACETAMINOPHEN 325 MG/1
650 TABLET ORAL EVERY 4 HOURS PRN
Status: DISCONTINUED | OUTPATIENT
Start: 2024-12-09 | End: 2024-12-09 | Stop reason: HOSPADM

## 2024-12-09 RX ORDER — APREPITANT 40 MG/1
CAPSULE ORAL AS NEEDED
Status: DISCONTINUED | OUTPATIENT
Start: 2024-12-09 | End: 2024-12-09

## 2024-12-09 RX ORDER — SODIUM CHLORIDE 0.9 G/100ML
IRRIGANT IRRIGATION AS NEEDED
Status: DISCONTINUED | OUTPATIENT
Start: 2024-12-09 | End: 2024-12-09 | Stop reason: HOSPADM

## 2024-12-09 RX ORDER — HYDROMORPHONE HYDROCHLORIDE 0.2 MG/ML
0.2 INJECTION INTRAMUSCULAR; INTRAVENOUS; SUBCUTANEOUS EVERY 5 MIN PRN
Status: DISCONTINUED | OUTPATIENT
Start: 2024-12-09 | End: 2024-12-09 | Stop reason: HOSPADM

## 2024-12-09 RX ORDER — SODIUM CHLORIDE, SODIUM LACTATE, POTASSIUM CHLORIDE, CALCIUM CHLORIDE 600; 310; 30; 20 MG/100ML; MG/100ML; MG/100ML; MG/100ML
50 INJECTION, SOLUTION INTRAVENOUS CONTINUOUS
Status: DISCONTINUED | OUTPATIENT
Start: 2024-12-09 | End: 2024-12-09 | Stop reason: HOSPADM

## 2024-12-09 RX ORDER — DOCUSATE SODIUM 100 MG/1
100 CAPSULE, LIQUID FILLED ORAL 2 TIMES DAILY PRN
Qty: 20 CAPSULE | Refills: 0 | Status: SHIPPED | OUTPATIENT
Start: 2024-12-09

## 2024-12-09 RX ORDER — PROPOFOL 10 MG/ML
INJECTION, EMULSION INTRAVENOUS AS NEEDED
Status: DISCONTINUED | OUTPATIENT
Start: 2024-12-09 | End: 2024-12-09

## 2024-12-09 RX ORDER — ESMOLOL HYDROCHLORIDE 10 MG/ML
INJECTION INTRAVENOUS AS NEEDED
Status: DISCONTINUED | OUTPATIENT
Start: 2024-12-09 | End: 2024-12-09

## 2024-12-09 RX ORDER — ONDANSETRON HYDROCHLORIDE 2 MG/ML
4 INJECTION, SOLUTION INTRAVENOUS ONCE AS NEEDED
Status: DISCONTINUED | OUTPATIENT
Start: 2024-12-09 | End: 2024-12-09 | Stop reason: HOSPADM

## 2024-12-09 RX ORDER — HYDROMORPHONE HYDROCHLORIDE 1 MG/ML
INJECTION, SOLUTION INTRAMUSCULAR; INTRAVENOUS; SUBCUTANEOUS CONTINUOUS PRN
Status: DISCONTINUED | OUTPATIENT
Start: 2024-12-09 | End: 2024-12-09

## 2024-12-09 RX ORDER — REMIFENTANIL HYDROCHLORIDE 1 MG/ML
INJECTION, POWDER, LYOPHILIZED, FOR SOLUTION INTRAVENOUS AS NEEDED
Status: DISCONTINUED | OUTPATIENT
Start: 2024-12-09 | End: 2024-12-09

## 2024-12-09 RX ORDER — LIDOCAINE HYDROCHLORIDE 20 MG/ML
INJECTION, SOLUTION INFILTRATION; PERINEURAL AS NEEDED
Status: DISCONTINUED | OUTPATIENT
Start: 2024-12-09 | End: 2024-12-09

## 2024-12-09 RX ORDER — MIDAZOLAM HYDROCHLORIDE 1 MG/ML
INJECTION INTRAMUSCULAR; INTRAVENOUS AS NEEDED
Status: DISCONTINUED | OUTPATIENT
Start: 2024-12-09 | End: 2024-12-09

## 2024-12-09 RX ORDER — ROCURONIUM BROMIDE 10 MG/ML
INJECTION, SOLUTION INTRAVENOUS AS NEEDED
Status: DISCONTINUED | OUTPATIENT
Start: 2024-12-09 | End: 2024-12-09

## 2024-12-09 RX ORDER — ONDANSETRON HYDROCHLORIDE 2 MG/ML
INJECTION, SOLUTION INTRAVENOUS AS NEEDED
Status: DISCONTINUED | OUTPATIENT
Start: 2024-12-09 | End: 2024-12-09

## 2024-12-09 RX ORDER — OXYCODONE HYDROCHLORIDE 5 MG/1
5 TABLET ORAL EVERY 4 HOURS PRN
Status: DISCONTINUED | OUTPATIENT
Start: 2024-12-09 | End: 2024-12-09 | Stop reason: HOSPADM

## 2024-12-09 RX ORDER — CEPHALEXIN 500 MG/1
500 CAPSULE ORAL 3 TIMES DAILY
Qty: 9 CAPSULE | Refills: 0 | Status: SHIPPED | OUTPATIENT
Start: 2024-12-09 | End: 2024-12-12

## 2024-12-09 RX ORDER — CEFAZOLIN 1 G/1
INJECTION, POWDER, FOR SOLUTION INTRAVENOUS AS NEEDED
Status: DISCONTINUED | OUTPATIENT
Start: 2024-12-09 | End: 2024-12-09

## 2024-12-09 RX ORDER — TRAMADOL HYDROCHLORIDE 50 MG/1
50 TABLET ORAL EVERY 6 HOURS PRN
Qty: 12 TABLET | Refills: 0 | Status: SHIPPED | OUTPATIENT
Start: 2024-12-09 | End: 2024-12-12

## 2024-12-09 RX ORDER — LIDOCAINE HYDROCHLORIDE 10 MG/ML
0.1 INJECTION, SOLUTION INFILTRATION; PERINEURAL ONCE
Status: DISCONTINUED | OUTPATIENT
Start: 2024-12-09 | End: 2024-12-09 | Stop reason: HOSPADM

## 2024-12-09 RX ORDER — PHENYLEPHRINE 10 MG/250 ML(40 MCG/ML)IN 0.9 % SOD.CHLORIDE INTRAVENOUS
CONTINUOUS PRN
Status: DISCONTINUED | OUTPATIENT
Start: 2024-12-09 | End: 2024-12-09

## 2024-12-09 RX ORDER — LIDOCAINE HYDROCHLORIDE AND EPINEPHRINE 10; 10 UG/ML; MG/ML
INJECTION, SOLUTION INFILTRATION; PERINEURAL AS NEEDED
Status: DISCONTINUED | OUTPATIENT
Start: 2024-12-09 | End: 2024-12-09 | Stop reason: HOSPADM

## 2024-12-09 ASSESSMENT — PAIN SCALES - GENERAL
PAINLEVEL_OUTOF10: 2
PAINLEVEL_OUTOF10: 4
PAINLEVEL_OUTOF10: 0 - NO PAIN
PAINLEVEL_OUTOF10: 4
PAINLEVEL_OUTOF10: 0 - NO PAIN

## 2024-12-09 ASSESSMENT — PAIN - FUNCTIONAL ASSESSMENT
PAIN_FUNCTIONAL_ASSESSMENT: 0-10

## 2024-12-09 ASSESSMENT — PAIN DESCRIPTION - LOCATION: LOCATION: JAW

## 2024-12-09 ASSESSMENT — PAIN DESCRIPTION - ORIENTATION: ORIENTATION: LEFT

## 2024-12-09 NOTE — ANESTHESIA PREPROCEDURE EVALUATION
Patient: Christa Christie    Procedure Information       Date/Time: 12/09/24 0645    Procedure: Insertion Cochlear Implant (Left) - 632/612 AS BACK UP    Location: [facility] OR 05 / [facility] OR    Surgeons: Corrina Pacheco MD            Relevant Problems   Anesthesia (within normal limits)      Cardiac   (+) Hypertension   (+) Mixed hyperlipidemia      Neuro   (+) Anxiety   (+) Depression      GI   (+) GERD (gastroesophageal reflux disease)      Musculoskeletal   (+) Cervical spinal stenosis      HEENT   (+) Bilateral asymmetric sensorineural hearing loss   (+) Sensory hearing loss, bilateral       Clinical information reviewed:   Tobacco  Allergies  Meds   Med Hx  Surg Hx  OB Status  Fam Hx  Soc   Hx        NPO Detail:  NPO/Void Status  Carbohydrate Drink Given Prior to Surgery? : N  Date of Last Liquid: 12/09/24  Time of Last Liquid: 0430  Date of Last Solid: 12/08/24  Time of Last Solid: 1730  Last Intake Type: Clear fluids  Time of Last Void: 0600         Physical Exam    Airway  Mallampati: III     Cardiovascular    Dental    Pulmonary    Abdominal            Anesthesia Plan    History of general anesthesia?: yes  History of complications of general anesthesia?: no    ASA 2     general     intravenous induction   Anesthetic plan and risks discussed with patient and spouse.    Plan discussed with resident.

## 2024-12-09 NOTE — DISCHARGE INSTRUCTIONS
Postoperative Care Instructions:  Cochlear Implantation    Postoperative Care:    24-hours after surgery, remove the large dressing from around your head. Remove the gauzes and the shiny C shaped gauze. You can shower three days after surgery, but do not completely submerge your ear under water until cleared to do so by Dr. Pacheco.    You will notice some sticky strips behind your ear - these are called “Steri-strips,” and should remain in place until your follow up appointment.  They may begin to curl up on the edges, but do not pull them off on your own.  Observe the incision for any increasing redness, swelling, pain, or foul-smelling drainage.  If you have any of these symptoms, please call Dr. Pacheco's office immediately.    Avoid blowing your nose, lifting objects heavier than a jug of milk, or straining for any reason until your follow-up appointment.   Sneeze with your mouth open.  Sleep with your head elevated for two days after surgery.  Avoid any airplane travel until your follow up appointment.  Take your antibiotics as prescribed for seven days after surgery, and take your pain medications only as prescribed and only as needed for moderate to severe pain.    Please call Dr. Pacheco's office at [phone] as soon as possible to make a follow up appointment in 3-5 weeks.    What to Expect Following Surgery:    The following are some symptoms that may follow your recent ear surgery:    Hearing - Although your cochlear implant has not yet been permanently activated, it was tested in the operating room while you were still asleep to make sure it was in position and working appropriately.  You must call the [facility] Department of Audiology to make an appointment to have your cochlear implant activated in three to four weeks.   Please call as soon as possible after surgery, as these appointments fill very quickly.    Taste disturbance and mouth dryness - These symptoms may occur for a few  weeks following ear surgery, and are usually temporary, but can be prolonged in rare cases.   Taste disturbance usually presents as a metallic taste in the mouth, but can come in other forms as well.    Tinnitus - Tinnitus (head noise or ringing in the ears), frequently present before surgery, is very common after surgery, but is usually temporary.  This can persist for one to two months and then may decrease in intensity as your hearing improves.     Jaw symptoms - The front wall of the ear canal is the same as the back wall of the jaw joint, and therefore some temporary discomfort with jaw movement is common after ear surgery.  It usually subsides within one to two months.    Numbness of the ear - Temporary loss of skin sensation in and/or around the ear is common after ear surgery.  This numbness may involve the entire outer ear and may last for six months or more.    Dizziness: Common after ear surgery typically gradually resolves over few days. If severe and associated with significant nausea and vomiting, contact the office.

## 2024-12-09 NOTE — OP NOTE
Insertion Cochlear Implant (L) Operative Note     Date: 2024  OR Location: [facility] OR    Name: Christa Christie, : 1965, Age: 59 y.o., MRN: [id number], Sex: female    Pre-operative diagnosis:   Bilateral severe to profound sensorineural hearing loss    Post-operative diagnosis:  Bilateral severe to profound sensorineural hearing loss     Procedure:  Left cochlear implantation using a Nucleus  device with mastoidectomy   Intraoperative facial nerve monitoring  Microsurgical techniques requiring use of operating microscope   Neural response telemetry    Surgeon:  Corrina Pacheco MD    Assistant surgeon: Nicole Bell MD    Anesthesia: general endotracheal    Estimated blood loss: 5    History: Christa Christie is a 59 y.o. female presented for evaluation of bilateral hearing loss and possibly cochlear implantation to rehabilitate their hearing loss. The patient met the audiometric candidacy criteria and good understanding and adequate motivation. Imaging obtained showed adequate mastoid and inner ear anatomy to allow placement of the implant. Appropriate counseling was conducted by our implant team engaging the patient and their family. The risks were discussed and included but not limited to bleeding, infection, device failure, dizziness, tinnitus, taste disturbance and rarely facial paralysis. The benefits of hearing preservation were reviewed. The patient understands that preserving residual hearing cannot be guaranteed despite every effort to achieve it. The patient elected to proceed with cochlear implantation. A hearing preserving electrode was chosen. The site was selected based on anatomical, audiometric, medical and personal factors.     Operative findings: Narrow facial recess- chorda tympani skeletonized and reflected out of bony covering, the active electrode array was introduced using the transmastoid-facial recess approach through a round window cochleostomy. The basal turn was  patent and full insertion was achieved. The cochleostomy was sealed with free muscle grafting. SmartNav showed good placement check, Impedances and Neural responses were appropriate.    Operative note: The patient was met in the pre-operative area. The informed consent was reviewed and signed. The correct side was marked. The patient was then taken back to the operative suite and laid on the operating table. A time out was conducted according to the protocol. General anesthesia was induced, and endotracheal intubation was done. No long-acting paralytic agents were used. Intravenous pre-operative antibiotics were administered. The table was tilted 180 degrees. The patient was secured and strapped to the surgical bed. The electrodes of the facial nerve monitoring system were inserted percutaneously in the orbicularis oris and oculi respectively. The circuit was checked, and adequate responses were obtained.     The operative ear was exposed.  A strip of hair was removed using a hair clipper. The ear and post-auricular area were prepped and draped using the standard sterile techniques.     A mixture of 1% lidocaine and 1/100.000 epinephrine was injected in the post-auricular region. A curvilinear post-auricular incision slightly curved superiorly was carried out and skin flaps were elevated along the plane of the superficial temporalis fascia.  A pocket was bluntly dissected in the tissue plane above the temporalis muscle to house the internal  stimulator. An anterior based musculo-periosteal flap was outlined and elevated in order to expose the mastoid cortex.  A sub-muscular tunnel was created anteriorly to house the extra-temporal electrode. Small piece of fascia was harvested and prepared to later pack the cochleostomy. A cortical mastoidectomy is completed using a combination of cutting and rosie burrs. The facial recess was then opened with a small rosie kiersten and the posterior mesotympanum was  exposed. A good view of the round window niche was achieved by removing bone medial to the facial nerve. Next a bony seat was created in the calvarial bone using cutting and rosie burrs. A well was also created to connect the seat to the mastoid cavity. Appropriate sizing was confirmed using the template. Next, I performed irrigation and achieved hemostasis. Then, I removed the bony overhang and pseudo-membrane to visualize the round window membrane proper. I then the round window was incised and opened with a micro-hook ensuring adequate size for electrode placement and then carefully sealed with muscle grafting to minimize russell-lymphatic oozing. No ossification was present. The internal- was placed in the bony recess and secured. The active electrode sheath was gradually advanced into the cochleostomy. When the sheath stopper reached the round window, the handle was stabilized and the electrode was advanced off the sheath achieving full insertion. The electrode sheath is then pulled back and removed.  The cochleostomy was then sealed with fascia and Healon was used to fill the middle ear space. The ground electrode was inserted into the previously created sub-muscular pocket. The electrodes were carefully coiled in the mastoid. The Musculo-periosteal flap was reflected back and reapproximated with interrupted 3.0 vicryl. Deep and superficial dermal closure was achieved using 3.0 vicryl. Steri-strips were placed along the incision line and a mastoid dressing was applied. Neural responses and impedances were checked and were noted to be appropriate. All needle and sponge counts were correct. The table was tilted back to the anesthesiologist and the patient was awakened from anesthesia, extubated and transferred to recovery in good condition. I was present and supervised the entire procedure.     Complications:  None; patient tolerated the procedure well.    Disposition: PACU - hemodynamically  stable.  Condition: stable     Attending Attestation:     Corrina Pacheco  Phone Number: [phone]

## 2024-12-09 NOTE — ANESTHESIA POSTPROCEDURE EVALUATION
Patient: Christa Christie    Procedure Summary       Date: 12/09/24 Room / Location: [facility] OR 05 / [facility] [place]    Anesthesia Start: 0725 Anesthesia Stop: 1018    Procedure: Insertion Cochlear Implant (Left: Ear) Diagnosis:       Sensory hearing loss, bilateral      (Sensory hearing loss, bilateral [H90.3])    Surgeons: Corrina Pacheco MD Responsible Provider: Verónica Cannon MD    Anesthesia Type: general ASA Status: 2            Anesthesia Type: general    Vitals Value Taken Time   /68 12/09/24 1014   Temp 36.2 12/09/24 1018   Pulse 73 12/09/24 1016   Resp 9 12/09/24 1016   SpO2 99 % 12/09/24 1016   Vitals shown include unfiled device data.    Anesthesia Post Evaluation    Patient location during evaluation: PACU  Patient participation: complete - patient participated  Level of consciousness: awake and alert  Pain management: adequate  Airway patency: patent  Cardiovascular status: acceptable, hemodynamically stable and blood pressure returned to baseline  Respiratory status: acceptable, spontaneous ventilation, unassisted and face mask  Hydration status: acceptable  Postoperative Nausea and Vomiting: none      There were no known notable events for this encounter.

## 2024-12-09 NOTE — ANESTHESIA PROCEDURE NOTES
Airway  Date/Time: 12/9/2024 7:43 AM  Urgency: elective    Airway not difficult    Staffing  Performed: resident   Authorized by: Verónica Cannon MD    Performed by: Owen Barakat MD  Patient location during procedure: OR    Indications and Patient Condition  Indications for airway management: anesthesia  Spontaneous Ventilation: absent  Sedation level: deep  Preoxygenated: yes  Patient position: sniffing  Mask difficulty assessment: 1 - vent by mask  Planned trial extubation    Final Airway Details  Final airway type: endotracheal airway      Successful airway: ETT  Cuffed: yes   Successful intubation technique: video laryngoscopy  Facilitating devices/methods: intubating stylet  Endotracheal tube insertion site: oral  Blade: Arnold  Blade size: #3  ETT size (mm): 6.5  Cormack-Lehane Classification: grade I - full view of glottis  Placement verified by: capnometry   Measured from: lips  ETT to lips (cm): 21  Number of attempts at approach: 1

## 2024-12-09 NOTE — H&P
History of present illness:  Christa Christie is a 59 y.o. female presenting today for E/M of bilateral SNHL    The patient's current medications, active allergies and list of medical problems were reviewed in the EHR and confirmed electronically there are as follows;    Allergies:   No Known Allergies  Problem list:  Patient Active Problem List   Diagnosis    Anxiety    Bilateral asymmetric sensorineural hearing loss    Cervical spinal stenosis    Depression    Discoid lupus erythematosus of left eyelid    Discoid lupus erythematosus of right eyelid    GERD (gastroesophageal reflux disease)    Hallux rigidus of right foot    Hematuria, microscopic    Hypertension    Back pain with radiation    Lung nodule    Medication side effect    Neuritis    OAB (overactive bladder)    Systemic lupus erythematosus (Multi)    Bowel habit changes    Acute midline low back pain with right-sided sciatica    Mixed hyperlipidemia    Anterolisthesis of lumbar spine    Sacroiliac pain    Severe rt groin pain    Trochanteric bursitis of right hip    Discoid lupus erythematosus    History of substance dependence (Multi)    Coronary artery calcification seen on CT scan    Sensory hearing loss, bilateral    Cognitive communication deficit    Other symbolic dysfunctions    Elevated fasting glucose     Current list of medications:   No current facility-administered medications for this encounter.     Facility-Administered Medications Ordered in Other Encounters   Medication Dose Route Frequency Provider Last Rate Last Admin    aprepitant (Emend) capsule   oral PRN Verónica Cannon MD   40 mg at 12/09/24 0645     Medical history:  Past Medical History:   Diagnosis Date    Anxiety     Coronary artery calcification seen on CT scan 05/08/2024    Coronary artery disease involving native coronary artery of native heart without angina pectoris 05/08/2024    Severe coronary calcification on CT 3/19/24    Discoid lupus     Encounter for gynecological  examination (general) (routine) without abnormal findings 2019    Pap test, as part of routine gynecological examination    Encounter for gynecological examination (general) (routine) without abnormal findings 2019    Pap test, as part of routine gynecological examination    GERD (gastroesophageal reflux disease)     Hearing aid worn     HL (hearing loss)     Hyperlipidemia     Hypertension     Immunosuppression     Hydroxychloroquine    Joint pain     Lung nodule     Lupus     Panic disorder (episodic paroxysmal anxiety)     Panic attack    Personal history of other diseases of the nervous system and sense organs     History of tinnitus    Personal history of other specified conditions     History of heartburn    Respiratory complication, postoperative     Unspecified asthma, uncomplicated ([facility])     Asthmatic bronchitis    Upper respiratory tract infection      Surgical history:  Past Surgical History:   Procedure Laterality Date    BACK SURGERY  10/22/2014    Back Surgery     SECTION, CLASSIC  2022     Section    COLONOSCOPY      OTHER SURGICAL HISTORY  06/10/2020    Tonsillectomy with adenoidectomy    OTHER SURGICAL HISTORY  06/10/2020    Foot surgery     Family history:  Family History   Problem Relation Name Age of Onset    Other (cardiac disorder) Mother      Lung cancer Mother  85    Other (cardiac disorder) Father      Breast cancer Neg Hx       Social history:  Social History     Tobacco Use    Smoking status: Former     Current packs/day: 0.00     Average packs/day: 1 pack/day for 30.0 years (30.0 ttl pk-yrs)     Types: Cigarettes     Start date: 1987     Quit date: 2017     Years since quittin.9     Passive exposure: Past    Smokeless tobacco: Never   Vaping Use    Vaping status: Never Used   Substance Use Topics    Alcohol use: Yes     Alcohol/week: 2.0 standard drinks of alcohol     Types: 2 Glasses of wine per week    Drug use: Never       Physical  Examination:  CONSTITUTIONAL:  No acute distress  VOICE:  No hoarseness or other abnormality  RESPIRATION:  Breathing comfortably, no stridor  CV:  No clubbing/cyanosis/edema in hands  EYES:  EOM intact, sclera clear  NEURO:  Alert and oriented times 3, Cranial nerves II-XII grossly intact and symmetric bilaterally  HEAD AND FACE:  Symmetric facial features, no masses or lesions  RIGHT EAR:  Normal external ear and post auricular area, no visible lesions, external auditory canal patent, tympanic membrane intact, no retraction, no signs of mass, effusion, or infection within the middle ear  LEFT EAR: Normal external ear and post auricular area, no visible lesions, external auditory canal patent, tympanic membrane intact, no retraction, no signs of mass, effusion, or infection within the middle ear  NOSE:  Anterior rhinoscopy clear, no significant external deformity.  ORAL CAVITY/OROPHARYNX/LIPS:  normal lining, mobile tongue.  PHARYNGEAL WALLS: Moist mucosal lining, good palatal elevation  NECK/LYMPH:  No LAD, no thyroid masses, trachea midline  SKIN:  Neck and facial skin is without scar or injury  PSYCH:  Alert and oriented with appropriate mood and affect        Impression:  Bilateral SNHL  Recommendation:  Left sequential CI  I discussed with the patient the complexity of my medical decision making including the treatment and testing rational, indications of their elective procedure and possible adverse effects and/or complications. Based on the provided documentation and my professional assessment of this patient's [acute condition/acute exacerbation of their chronic condition/newly diagnosed condition/progression of their condition/complicated course of their medical condition], the complexity of evaluation and treatment is [low-moderate-high].    This note was created using speech recognition transcription software. Despite proofreading, several typographical errors might be present that might affect the  meaning of the content. Please call with any questions.        Corrina Pacheco MD

## 2024-12-09 NOTE — PROGRESS NOTES
Cochlear Implant Intra-Operative Monitoring     Date: 12/09/24   Surgeon: Dr. Corrina Pacheco MD  Audiology Extern: RICKEY Guillory       Implanted Ear: Left  : [employer]chle[employer]  Implant:   Serial Number: [id number]     Implant Name Type Inv. Item Serial No.  Lot No. LRB No. Used Action   COCHLEAR, NUCLEUS , PROFILE PLUS W/SLIM MODIOLAR ELECTRODE - [id number] - [id number] ENT Implant COCHLEAR, NUCLEUS , PROFILE PLUS W/SLIM MODIOLAR ELECTRODE [id number] [employer]  Left 1 Implanted        Electrode Insertion Achieved: Full     Placement Check: Within normal limits    Impedances:  Impedances were measured on electrodes 1-22 and within normal limits.     eSRT: Deferred by surgeon.    NRTs: Completed on all electrodes, see below.        ECoG: Intracochlear electrocochleography was not clinically indicated.      Equipment and Forms:   The patient's equipment backpack was provided to the family. They were counseled to bring the backpack to activation. Post-operative course was reviewed with the family. The patient is instructed to stop using their hearing aid on the newly implanted ear. They are encouraged to continue using their hearing aid on the non-implanted ear.     The family was provided with the internal implant guide, MRI compatibility booklet, and implant identification card specific to the patient's implant. The patient should place this in their wallet. The family was counseled that the internal implant was registered; indicating the start of the 10 year 's warranty. The external equipment, located in the backpack provided, will be registered at the activation; indicating the start of the 5 year 's warranty on the processor.     The activation follow up schedule was reviewed and a completed form with the appointments was provided.    Post operative questions should be guided to the  Patient Navigator Cochlear Implant Program, Kristine Henry RN at [phone].      Appointments  The patient is scheduled for follow up with Geronimo Morel CCC-GILBERTO at the [place] location.          Intraoperative testing was completed by RICKEY Guillory. Reviewed by Franco Khoury, Ph.D. FARZANEH-A

## 2024-12-12 ENCOUNTER — APPOINTMENT (OUTPATIENT)
Dept: OTOLARYNGOLOGY | Facility: CLINIC | Age: 59
End: 2024-12-12
Payer: MEDICARE

## 2024-12-23 NOTE — PROGRESS NOTES
History Of Present Illness:  Christa Christie is a 59 y.o. female with a history of a right CI, now s/p left CI with Dr. Pacheco on 24 who presents to me today for a postoperative visit. Patient reports no complications since the procedure. Appointment with audiology for CI activation is currently scheduled for 24.     Recall 24:  Christa Christie is presenting for a follow-up visit.  This was done using the audiovisual platform.  She is recipient of her right cochlear implant.  She is doing really well with her cochlear implant and seems to be progressing very nicely.  She is interested in having a sequential left-sided cochlear implant.     Surgical History:  She has a past surgical history that includes Back surgery (10/22/2014);  section, classic (2022); Other surgical history (06/10/2020); Other surgical history (06/10/2020); and Colonoscopy.     Allergies:  Patient has no known allergies.    Medications:   Current Outpatient Medications   Medication Instructions    aspirin 81 mg, Daily RT    atorvastatin (Lipitor) 40 mg tablet TAKE 1 TABLET BY MOUTH EVERY DAY    docusate sodium (COLACE) 100 mg, oral, 2 times daily PRN    esomeprazole (NexIUM) 20 mg DR capsule TAKE 1 CAPSULE BY MOUTH ONCE DAILY BEFORE EATING    hydroxychloroquine (Plaquenil) 200 mg tablet TAKE ONE & ONE-HALF TABLETS BY MOUTH DAILY    metoprolol succinate XL (TOPROL-XL) 50 mg, oral, Daily    sucralfate (CARAFATE) 1 g, oral, 2 times daily before meals    triamcinolone (Kenalog) 0.1 % cream APPLY AND RUB IN A THIN FILM TO AFFECTED AREAS TWICE DAILY.(AM AND PM).      Review of Systems:   A comprehensive 10-point review of systems was obtained including constitutional, neurological, HEENT, pulmonary, cardiovascular, genito-urinary, and other pertinent systems and was negative except as noted in the HPI.      Physical Exam:  Constitutional   General appearance: Healthy-appearing, well-nourished, well groomed, in no acute  "distress.   Ability to communicate: Normal communication without aids, normal voice quality.       Head and face: Atraumatic with no masses, lesions, or scarring.   Facial strength: Normal strength and symmetry, no synkinesis or facial tic.     Ears  Otoscopic examination:   Left: Postauricular incision is healing well, no hemotympanum noted on exam today.      Nose: Dorsum symmetric with no visible or palpable deformities.     Oral Cavity/Mouth  Lips, teeth, and gums: Normal lips, gums, and dentition.     Oropharynx: Mucosa moist, no lesions.     Neck: Symmetrical, trachea midline. No masses visible.     Neurological/Psychiatric  Cranial Nerve Examination: II - XII grossly intact.  Orientation to person, place, and time: Normal.  Mood and affect: Normal.     Skin: Normal without rashes or lesions.     Pulmonary  Respiratory effort: Chest expands symmetrically.     Cardiovascular: Good peripheral pulses  Peripheral vascular system: No varicosities, carotid pulse normal, no edema. No jugular venous distension.     Extremities: Appearance of extremities: Normal. Gait normal.     Last Recorded Vitals:  Height 1.626 m (5' 4\"), weight 71.7 kg (158 lb).     Assessment/Plan   59 y.o. female with a history of a right CI, now s/p left CI with Dr. Pacheco on 12/9/24 who presents to me today for a postoperative visit. Patient reports no complications since the procedure. Appointment with audiology for CI activation is currently scheduled for 12/27/24.    - Proceed with audiology appointment for CI activation as scheduled on 12/27/24  - RTC in 6 months with Dr. Junior Combs MD  Neurotology Fellow    I saw and evaluated the patient. I personally obtained the key and critical portions of the history and physical exam or was physically present for key and critical portions performed by the resident/fellow. I reviewed the resident/fellow's documentation and discussed the patient with the resident/fellow. I agree " with the resident/fellow's medical decision making as documented in the note.    Chikis Verdugo MD

## 2024-12-24 ENCOUNTER — APPOINTMENT (OUTPATIENT)
Dept: OTOLARYNGOLOGY | Facility: CLINIC | Age: 59
End: 2024-12-24
Payer: MEDICARE

## 2024-12-24 VITALS — WEIGHT: 158 LBS | HEIGHT: 64 IN | BODY MASS INDEX: 26.98 KG/M2

## 2024-12-24 DIAGNOSIS — Z98.890 POSTOPERATIVE STATE: Primary | ICD-10-CM

## 2024-12-24 DIAGNOSIS — Z96.21 COCHLEAR IMPLANT IN PLACE: ICD-10-CM

## 2024-12-24 PROCEDURE — 99024 POSTOP FOLLOW-UP VISIT: CPT | Performed by: OTOLARYNGOLOGY

## 2024-12-24 PROCEDURE — 3008F BODY MASS INDEX DOCD: CPT | Performed by: OTOLARYNGOLOGY

## 2024-12-24 ASSESSMENT — PATIENT HEALTH QUESTIONNAIRE - PHQ9
2. FEELING DOWN, DEPRESSED OR HOPELESS: NOT AT ALL
SUM OF ALL RESPONSES TO PHQ9 QUESTIONS 1 AND 2: 0
1. LITTLE INTEREST OR PLEASURE IN DOING THINGS: NOT AT ALL

## 2024-12-24 NOTE — LETTER
2024     Corrina Pacheco MD  [address]  [address]  [facility] [address]    Patient: Christa Christie   YOB: 1965   Date of Visit: 2024       Dear Dr. Corrina Pacheco MD:    Thank you for referring Christa Christie to me for evaluation. Below are my notes for this consultation.  If you have questions, please do not hesitate to call me. I look forward to following your patient along with you.       Sincerely,     Chikis Verdugo MD      CC: Brenda Fowler, VANDANA, CCC-A  ______________________________________________________________________________________    History Of Present Illness:  Christa Christie is a 59 y.o. female with a history of a right CI, now s/p left CI with Dr. Pacheco on 24 who presents to me today for a postoperative visit. Patient reports no complications since the procedure. Appointment with audiology for CI activation is currently scheduled for 24.     Recall 24:  Christa Christie is presenting for a follow-up visit.  This was done using the audiovisual platform.  She is recipient of her right cochlear implant.  She is doing really well with her cochlear implant and seems to be progressing very nicely.  She is interested in having a sequential left-sided cochlear implant.     Surgical History:  She has a past surgical history that includes Back surgery (10/22/2014);  section, classic (2022); Other surgical history (06/10/2020); Other surgical history (06/10/2020); and Colonoscopy.     Allergies:  Patient has no known allergies.    Medications:   Current Outpatient Medications   Medication Instructions   • aspirin 81 mg, Daily RT   • atorvastatin (Lipitor) 40 mg tablet TAKE 1 TABLET BY MOUTH EVERY DAY   • docusate sodium (COLACE) 100 mg, oral, 2 times daily PRN   • esomeprazole (NexIUM) 20 mg DR capsule TAKE 1 CAPSULE BY MOUTH ONCE DAILY BEFORE EATING   • hydroxychloroquine (Plaquenil) 200 mg tablet TAKE ONE & ONE-HALF TABLETS BY  "MOUTH DAILY   • metoprolol succinate XL (TOPROL-XL) 50 mg, oral, Daily   • sucralfate (CARAFATE) 1 g, oral, 2 times daily before meals   • triamcinolone (Kenalog) 0.1 % cream APPLY AND RUB IN A THIN FILM TO AFFECTED AREAS TWICE DAILY.(AM AND PM).      Review of Systems:   A comprehensive 10-point review of systems was obtained including constitutional, neurological, HEENT, pulmonary, cardiovascular, genito-urinary, and other pertinent systems and was negative except as noted in the HPI.      Physical Exam:  Constitutional   General appearance: Healthy-appearing, well-nourished, well groomed, in no acute distress.   Ability to communicate: Normal communication without aids, normal voice quality.       Head and face: Atraumatic with no masses, lesions, or scarring.   Facial strength: Normal strength and symmetry, no synkinesis or facial tic.     Ears  Otoscopic examination:   Left: Postauricular incision is healing well, no hemotympanum noted on exam today.      Nose: Dorsum symmetric with no visible or palpable deformities.     Oral Cavity/Mouth  Lips, teeth, and gums: Normal lips, gums, and dentition.     Oropharynx: Mucosa moist, no lesions.     Neck: Symmetrical, trachea midline. No masses visible.     Neurological/Psychiatric  Cranial Nerve Examination: II - XII grossly intact.  Orientation to person, place, and time: Normal.  Mood and affect: Normal.     Skin: Normal without rashes or lesions.     Pulmonary  Respiratory effort: Chest expands symmetrically.     Cardiovascular: Good peripheral pulses  Peripheral vascular system: No varicosities, carotid pulse normal, no edema. No jugular venous distension.     Extremities: Appearance of extremities: Normal. Gait normal.     Last Recorded Vitals:  Height 1.626 m (5' 4\"), weight 71.7 kg (158 lb).     Assessment/Plan  59 y.o. female with a history of a right CI, now s/p left CI with Dr. Pacheco on 12/9/24 who presents to me today for a postoperative visit. Patient " reports no complications since the procedure. Appointment with audiology for CI activation is currently scheduled for 12/27/24.    - Proceed with audiology appointment for CI activation as scheduled on 12/27/24  - RTC in 6 months with Dr. Junior Combs MD  Neurotology Fellow    I saw and evaluated the patient. I personally obtained the key and critical portions of the history and physical exam or was physically present for key and critical portions performed by the resident/fellow. I reviewed the resident/fellow's documentation and discussed the patient with the resident/fellow. I agree with the resident/fellow's medical decision making as documented in the note.    Chikis Verdugo MD

## 2024-12-27 ENCOUNTER — APPOINTMENT (OUTPATIENT)
Dept: AUDIOLOGY | Facility: CLINIC | Age: 59
End: 2024-12-27
Payer: MEDICARE

## 2025-01-02 ENCOUNTER — APPOINTMENT (OUTPATIENT)
Dept: AUDIOLOGY | Facility: CLINIC | Age: 60
End: 2025-01-02
Payer: MEDICARE

## 2025-01-02 ENCOUNTER — CLINICAL SUPPORT (OUTPATIENT)
Dept: AUDIOLOGY | Facility: CLINIC | Age: 60
End: 2025-01-02
Payer: MEDICARE

## 2025-01-02 DIAGNOSIS — H93.13 TINNITUS OF BOTH EARS: ICD-10-CM

## 2025-01-02 DIAGNOSIS — H90.3 SENSORINEURAL HEARING LOSS, BILATERAL: Primary | ICD-10-CM

## 2025-01-02 PROCEDURE — 92603 COCHLEAR IMPLT F/UP EXAM 7/>: CPT | Performed by: AUDIOLOGIST

## 2025-01-02 PROCEDURE — 92567 TYMPANOMETRY: CPT | Performed by: AUDIOLOGIST

## 2025-01-02 NOTE — Clinical Note
FYI, she missed her CI activation because she had severe vertigo for 3 days. She reports some imbalance after surgery but this was 12/27 that the vertigo came on. Just wanted to keep you in the loop

## 2025-01-02 NOTE — PROGRESS NOTES
COCHLEAR IMPLANT ACTIVATION         Name:  Christa Christie  :  1965  Age:  59 y.o.  Date of Evaluation:  2025     RIGHT DEVICE  External Device: Cochlear [id number] (N8) sound processor  Internal Device: [employer]  [id number]   Surgeon: Corrina Pacheco MD  Implantation Date: 2024  Activation Date: 2024    LEFT DEVICE  External Device: Cochlear [id number] (N8) sound processor  Internal Device: [employer]  [id number]  Surgeon: Corrina Pacheco MD  Implantation Date: 2024  Activation Date: 2025    HISTORY  Christa Christie arrives today for the initial activation of the [employer]  cochlear implantation (SN: [id number]) in the left cochlea used in conjunction with a Cochlear EK2400 (N8) sound processor.  She is a bilateral sequential CI recipient. The patient has been medically evaluated and cleared by their surgeon to continue with today's activation. The patient reports she had to cancel her initial activation last week due to severe vertigo. She reports symptoms came on gradually as lightheadedness, and then was spinning vertigo for 3 days. Symptoms spontaneously improved and were not better or worse with right CI use. She had some imbalance following left CI surgery.    Recall: Mrs. Christie had moderate to profound sensorineural hearing loss which was progressive. Right ear was worse (profound) when she obtained a cochlear implant in  and then had a left CI in later .    LEFT PROGRAMMING  Headset pressure, magnet strength (3I), and incision site were checked with no problems noted.     Electrode impedances, used to monitor internal device function, were measured across the electrode array.  Impedance measures were within normal limits for all electrodes, in each stimulation mode. Current parameters of MP1+2 stimulation mode, an ACE speech processing strategy, 8 maxima, and a 900 Hz stimulation rate were maintained. Programming consisted of  using the population mean protocol. Enabled NRT measurements from surgery. Increased globally with live speech to loud but comfortable. Swept Comfort (C) levels and increased until loud but comfortable (MAP 10). Patient reports speech is loud but comfortable. Reports right CI is louder than left CI.  Swept C levels with no nonauditory stimulation observed. Created progressive programs. Current programming consists of:  P1 - MAP 10 - SCAN 2/ V6/ S12  P2 - MAP 11 - SCAN 2/ V6/ S12 - Cs +5 CL  P3 - MAP 12 - SCAN 2 / V6/ S12 - Cs + 10 CL  P4 - MAP 13 - SCAN 2/ V6/ S12- Cs +15 CL    All equipment was registered with [email or web address] by this clinician today.    RIGHT PROGRAMMING  Electrode impedances, used to monitor internal device function, were measured across the electrode array.  Impedance measures were within normal limits although fluctuating for all electrodes, in each stimulation mode. Current parameters of MP1+2 stimulation mode, an ACE speech processing strategy, 8 maxima, and a 900 Hz stimulation rate were maintained. Increased PW to 37 because 3 channels were out of compliance.     Current programming consists of:  P1 - MAP 9 - SCAN 2  enabled WNR/NR  Standard Microphone  V: 6  S: 12  P2 - MAP 9 - ADRO+Auto S  enabled WNR/NR  Focus nany  V: 6  S: 10  P3 - MAP 9 - ADRO+Auto S  enabled WNR/NR  Adaptive nany  V: 6  S: 8   P4 - MAP 9 - ADRO+Whisper  enabled WNR/NR  Standard nany  V: 6  S: 12    Did you meet with the [email or web address]  prior to today's appointment:  No  If no, Why? Bilateral sequential  Did you find this meeting helpful? No  Would you recommend this meeting to a family member or friend who is considering a cochlear implant:  No    Was patient more prepared for CI programming/activation due to meeting with the [email or web address] :  No    UNAIDED TESTING  RIGHT: Normal ear canal volume with reduced mobility. Profound sensorineural hearing loss    LEFT: Normal ear canal volume with reduced mobility. Profound sensorineural hearing loss     IMPRESSIONS  Positive stimulation on all active electrodes.  A reliable psychophysical MAP was created in the ACE speech processing strategy. Programming completed with population mean. Unaided testing indicated profound bilateral sensorineural hearing loss.     All components were reviewed with the patient and family including use of remote control/speech processor and the associated buttons, attaching/removing the rechargeable and disposable batteries, and charging units. Patient and family were advised of the written and video instruction material within the CI equipment to supplement today's counseling on the equipment. She was advised to complete daily auditory training in the CI only condition. This included using a closed-set, spondaic word list, use of auditory tracking techniques, discussed Hearing Therapy Manual, and Félix Sounds to assist with detection and identification skills as well as other resources. This was demonstrated for the patient and family. The patient was advised that (s)he should not experience any discomfort or dizziness, and to contact sooner if symptoms arise.    RECOMMENDATIONS  1. Continue medical follow-up with your neuro-otologist (Dr. Verdugo, Dr. Velez, or Dr. Pacheco)  2. Utilize the Cochlear [employer] Nucleus Cochlear HP0277 (N8) sound processor speech processor daily using the most tolerated program. Utilize progressive programs in an attempt to increase stimulation over the next week however to maintain in the most tolerated program. Work toward program 4 by the next appointment if tolerated.  3. Return in 1 month for your second stimulation.   4. Utilize aural rehabilitation/auditory training programs, books on tape, and written materials at home to improve speech understanding ability.  5. Communication strategies were discussed (utilizing visual cues/gestures; reducing background  noise and distance from desired source; increasing light to assist with visual cues; use of clear speech).  6. Contact [facility] CI Team auditory-verbal therapist to initiate aural rehabilitation therapy.    VANDANA Wu, CCC-A  Senior Clinical Audiologist    TIME: [phone]

## 2025-01-03 DIAGNOSIS — M32.9 SYSTEMIC LUPUS ERYTHEMATOSUS, UNSPECIFIED: ICD-10-CM

## 2025-01-03 RX ORDER — HYDROXYCHLOROQUINE SULFATE 200 MG/1
TABLET, FILM COATED ORAL
Qty: 135 TABLET | Refills: 0 | Status: SHIPPED | OUTPATIENT
Start: 2025-01-03

## 2025-01-12 DIAGNOSIS — R19.4 BOWEL HABIT CHANGES: ICD-10-CM

## 2025-01-13 RX ORDER — SUCRALFATE 1 G/1
1 TABLET ORAL
Qty: 180 TABLET | Refills: 0 | Status: SHIPPED | OUTPATIENT
Start: 2025-01-13 | End: 2026-01-13

## 2025-01-24 ENCOUNTER — APPOINTMENT (OUTPATIENT)
Dept: AUDIOLOGY | Facility: CLINIC | Age: 60
End: 2025-01-24
Payer: MEDICARE

## 2025-01-24 DIAGNOSIS — R26.89 BALANCE DISORDER: ICD-10-CM

## 2025-01-24 DIAGNOSIS — H93.13 TINNITUS OF BOTH EARS: ICD-10-CM

## 2025-01-24 DIAGNOSIS — H90.3 SENSORINEURAL HEARING LOSS, BILATERAL: Primary | ICD-10-CM

## 2025-01-24 PROCEDURE — 92604 REPROGRAM COCHLEAR IMPLT 7/>: CPT | Performed by: AUDIOLOGIST

## 2025-01-24 NOTE — PROGRESS NOTES
COCHLEAR IMPLANT SECOND STIMULATION         Name:  Christa Christie  :  1965  Age:  59 y.o.  Date of Evaluation:  2025     RIGHT DEVICE  External Device: Cochlear RI2746 (N8) sound processor  Internal Device: [employer]  [id number]   Surgeon: Corrina Pacheco MD  Implantation Date: 2024  Activation Date: 2024    LEFT DEVICE  External Device: Cochlear [id number] (N8) sound processor  Internal Device: [employer]  [id number]  Surgeon: Corrina Pacheco MD  Implantation Date: 2024  Activation Date: 2025    HISTORY  Christa Christie arrives today for the second stimulation of the [employer]  cochlear implantation (SN: [id number]) in the left cochlea used in conjunction with a Cochlear AO0590 (N8) sound processor.  She is a bilateral sequential CI recipient. She reports her processors became disabled from her phone and therefore she could not change progressive programs. She reports another episode of dizziness, feels spinning to the left when shy lies straight back. Spinning stops when she turns to the left or the right. She felt unsteady after the last episode for several days but feels better.    Recall: Mrs. Christie had moderate to profound sensorineural hearing loss which was progressive. Right ear was worse (profound) when she obtained a cochlear implant in  and then had a left CI in later .    LEFT PROGRAMMING  Headset pressure, magnet strength (3I), and incision site were checked with no problems noted.     Electrode impedances, used to monitor internal device function, were measured across the electrode array.  Impedance measures were within normal limits for all electrodes, in each stimulation mode. Current parameters of MP1+2 stimulation mode, an ACE speech processing strategy, 8 maxima, and a 900 Hz stimulation rate were maintained. Programming consisted of using the population mean protocol. Programming began from MAP 10. Enabled  NRT measurements from surgery. Swept Comfort (C) levels and increased until loud but comfortable (MAP 14). Patient reports speech is loud but comfortable. Reports right CI is louder than left CI.  Swept C levels with no nonauditory stimulation observed. Created progressive programs. Current programming consists of:  P1 - MAP 14 - SCAN 2  enabled WNR/NR  Standard Microphone  V: 6  S: 12  P2 - MAP 16 - SCAN 2  enabled WNR/NR  Standard Microphone  V: 6  S: 12 - Cs +5 CL  P3 - MAP 17 - SCAN 2  enabled WNR/NR  Standard Microphone  V: 6  S: 12- Cs + 10 CL  P4 - MAP 18 - SCAN 2  enabled WNR/NR  Standard Microphone  V: 6  S: 12- Cs +15 CL    RIGHT PROGRAMMING  Electrode impedances, used to monitor internal device function, were measured across the electrode array.  Impedance measures were within normal limits although fluctuating for all electrodes, in each stimulation mode. Current parameters of MP1+2 stimulation mode, an ACE speech processing strategy, 8 maxima, PW 37 and a 900 Hz stimulation rate were maintained. Decreased C levels globally 3 CLs for loudness balancing with left CI.    Current programming consists of:  P1 - MAP 15 - SCAN 2  enabled WNR/NR  Standard Microphone  V: 6  S: 12  P2 - MAP 15 - ADRO+Auto S  enabled WNR/NR  Focus nany  V: 6  S: 10  P3 - MAP 15 - ADRO+Auto S  enabled WNR/NR  Adaptive nany  V: 6  S: 8   P4 - MAP 15 - ADRO+Whisper  enabled WNR/NR  Standard nany  V: 6  S: 12    UNAIDED TESTING  Not indicated    IMPRESSIONS  Positive stimulation on all active electrodes.  A reliable psychophysical MAP was defined in the ACE speech processing strategy. Programming completed with population mean. Unaided testing indicated profound bilateral sensorineural hearing loss at the last appointment and not repeated today. Adequate detection with the left CI. Reconnected both devices to her aixa/phone. Phone clip not available today.     RECOMMENDATIONS  1. Continue medical follow-up  with your neuro-otologist (Dr. Verdugo, Dr. Velez, or Dr. Pacheco)  2. Utilize the Cochlear [employer] Nucleus Cochlear HH9438 (N8) sound processor speech processor daily using the most tolerated program. Utilize progressive programs in an attempt to increase stimulation over the next week however to maintain in the most tolerated program. Work toward program 4 by the next appointment if tolerated.  3. Return in 2 month for your third stimulation.   4. Utilize aural rehabilitation/auditory training programs, books on tape, and written materials at home to improve speech understanding ability.  5. Communication strategies were discussed (utilizing visual cues/gestures; reducing background noise and distance from desired source; increasing light to assist with visual cues; use of clear speech).  6. Contact [facility] CI Team auditory-verbal therapist to initiate aural rehabilitation therapy.    VANDANA Wu, CCC-A  Senior Clinical Audiologist    TIME: [phone]

## 2025-03-21 ENCOUNTER — TELEPHONE (OUTPATIENT)
Dept: RADIOLOGY | Facility: HOSPITAL | Age: 60
End: 2025-03-21

## 2025-03-21 ENCOUNTER — APPOINTMENT (OUTPATIENT)
Dept: AUDIOLOGY | Facility: CLINIC | Age: 60
End: 2025-03-21
Payer: MEDICARE

## 2025-03-21 DIAGNOSIS — H93.13 TINNITUS OF BOTH EARS: ICD-10-CM

## 2025-03-21 DIAGNOSIS — H90.3 SENSORINEURAL HEARING LOSS, BILATERAL: Primary | ICD-10-CM

## 2025-03-21 DIAGNOSIS — Z96.21 COCHLEAR IMPLANT IN PLACE: ICD-10-CM

## 2025-03-21 DIAGNOSIS — H90.3 BILATERAL SENSORINEURAL HEARING LOSS: ICD-10-CM

## 2025-03-21 DIAGNOSIS — R26.89 BALANCE DISORDER: ICD-10-CM

## 2025-03-21 NOTE — PROGRESS NOTES
COCHLEAR IMPLANT THIRD STIMULATION         Name:  Christa Christie  :  1965  Age:  59 y.o.  Date of Evaluation:  3/21/2025     RIGHT DEVICE  External Device: Cochlear [id number] (N8) sound processor  Internal Device: [employer]  [id number]   Surgeon: Corrina Pacheco MD  Implantation Date: 2024  Activation Date: 2024    LEFT DEVICE  External Device: Cochlear [id number] (N8) sound processor  Internal Device: [employer]  [id number]  Surgeon: Corrina Pacheco MD  Implantation Date: 2024  Activation Date: 3/21/2025    HISTORY  Christa Christie arrives today for the third stimulation of the [employer]  cochlear implantation (SN: [id number]) in the left cochlea used in conjunction with a Cochlear EL4431 (N8) sound processor.  She is a bilateral sequential CI recipient. She reports sharp shooting pain in her right ear that is in conjunction with lightheadedness and dizziness. She did not remove her processor to see if it was worse/better with CI stimulation. She needed to obtain a new left CI processor from Cochle[employer] because her left CI would not connect to her phone.    Recall: Mrs. Christie had moderate to profound sensorineural hearing loss which was progressive. Right ear was worse (profound) when she obtained a cochlear implant in  and then had a left CI in later .    LEFT PROGRAMMING  Headset pressure, magnet strength (3I), and incision site were checked with no problems noted. Datalogging revealed 12+ hours of use per day with 2+ hours in speech, on average since the last appointment.     Electrode impedances, used to monitor internal device function, were measured across the electrode array.  Impedance measures were within normal limits for all electrodes, in each stimulation mode. Current parameters of MP1+2 stimulation mode, an ACE speech processing strategy, 8 maxima, PW 37, and a 900 Hz stimulation rate were maintained. Programming consisted of  using the population mean protocol. Programming began from MAP 18.  Swept Comfort (C) levels and increased until loud but comfortable Patient reports speech is loud but comfortable.  Swept C levels with no nonauditory stimulation observed. Current programming consists of:    PROGRAM MAP SMARTSOUND WNR+SNR MICROPHONE VOLUME SENSITIVITY COMMENTS   1 MAP 19 SCAN2 Enabled Standard  Fixed  Adaptive 6 12 SCAN   2 MAP 19 ADRO+AutoS Enabled Fixed 6 10 Resturants   3 MAP 19 ADRO+AutoS Enabled Adaptive 6 8 Groups #3   4 MAP 19 ADRO+Whisper Enabled Standard 6 12 Music     RIGHT PROGRAMMING  Headset pressure, magnet strength (3I), and incision site were checked with no problems noted. Datalogging revealed 14+ hours of use per day with 2+ hours in speech, on average since the last appointment.     Electrode impedances, used to monitor internal device function, were measured across the electrode array.  Impedance measures were within normal limits although fluctuating for all electrodes, in each stimulation mode. Programming began from MAP 15. Levels are out of compliance for channels 9, 10, 12 and 17. Current parameters of MP1+2 stimulation mode, an ACE speech processing strategy, 8 maxima, PW 37 and a 900 Hz stimulation rate were maintained. Comfort levels were measured on a variety of channels with the remaining channels interpolated. Decrease C levels that were out of compliance so that they are within compliance and still loud but comfortable. Current programming consists of:    PROGRAM MAP SMARTSOUND WNR+SNR MICROPHONE VOLUME SENSITIVITY COMMENTS   1 MAP 20 SCAN2 Enabled Standard  Fixed  Adaptive 6 12 SCAN   2 MAP 20 ADRO+AutoS Enabled Fixed 6 10 Resturants   3 MAP 20 ADRO+AutoS Enabled Adaptive 6 8 Groups #3   4 MAP 20 ADRO+Whisper Enabled Standard 6 12 Music     FUNCTIONAL TESTING  Testing prior to programming at user settings (P1/V6/S12 - right and P4/V6/S12)    All testing was completed in the soundfield at 0  degrees azimuth. Pure tone testing was obtained using pulsed frequency modulating (FM) stimuli. Sentence and word recognition testing was performed with recorded material at 60 dBSPL.    LISTENING CONDITION Aided thresholds 250-6000 Hz CNC Words  [employer] Sentences in Quiet  [employer] Sentences in +10 SNR 4-talker babble (S0N0)    Bilateral CIs DNT DNT DNT 79%   Right CI only 20-35 dB HL DNT DNT DNT   Left CI only 15-25 dB HL 80% 91% 61%     The Cochlear Implant Quality of Life-35 Profile (CIQOL-35 Profile) is a patient-reported outcome measure that was developed to assess the functional ability of adult cochlear users in 6 domains: Communication, Emotional, Entertainment, Environmental, Listening effort, and Social. The patient's CIQOL-10 global raw score is 37 and converted score is 56.35.      UNAIDED TESTING  LEFT: Tympanometry revealed normal middle ear pressure, mobility, and ear canal volume.     RIGHT: Tympanometry revealed normal ear canal volume with limited mobility of middle ear.    IMPRESSIONS  Positive stimulation on all active electrodes.  A reliable psychophysical MAP was defined in the ACE speech processing strategy. Programming completed with population mean. Unaided testing indicated profound bilateral sensorineural hearing loss at the last appointment and not repeated today. Adequate detection, word/sentence recognition in quiet and noise with the left CI. Improved sentence recognition in noise in the bilateral CI condition. Adequate detection of right CI. Reconnected both devices to her aixa/phone.     RIGHT: Tympanometry revealed normal ear canal volume with limited mobility of middle ear. Fluctuating impedance with multiple channels out of compliance. Due to reports of sharp shooting pain in the right ear in conjunction with lightheadedness and dizziness - will contact Dr. Pacheco. Encouraged her to remove the sound processor when she has sharp shooting pain to determine if CI stimulation has an impact  on symptoms.    RECOMMENDATIONS  1. Continue medical follow-up with your neuro-otologist (Dr. Verdugo, Dr. Velez, or Dr. Pacheco)  2. Utilize the bilateral Cochlear [employer] Nucleus Cochlear [id number] (N8) sound processor speech processor daily using the most tolerated program.   3. Return in August, 2025 for annual CI appointment for right CI.   4. Utilize aural rehabilitation/auditory training programs, books on tape, and written materials at home to improve speech understanding ability.  5. Communication strategies were discussed (utilizing visual cues/gestures; reducing background noise and distance from desired source; increasing light to assist with visual cues; use of clear speech).  6. Contact [facility] CI Team auditory-verbal therapist to initiate aural rehabilitation therapy.    VANDANA Wu, CCC-A  Senior Clinical Audiologist    [phone]

## 2025-03-21 NOTE — Clinical Note
HiChe Goodwin is still reporting imbalance. She now is having sharp shooting pain in her left ear and she associates it with her imbalance/lightheadedness (shooting pain then imbalance comes). She had a flat B tymp today and is having fluctuating impedance with her CI - multiple channels out of compliance. Do you want to see her? Provider suturing line in place at this time

## 2025-03-30 DIAGNOSIS — M32.9 SYSTEMIC LUPUS ERYTHEMATOSUS, UNSPECIFIED SLE TYPE, UNSPECIFIED ORGAN INVOLVEMENT STATUS (MULTI): Primary | ICD-10-CM

## 2025-03-30 DIAGNOSIS — L98.9 SKIN LESION OF FACE: ICD-10-CM

## 2025-04-03 ENCOUNTER — HOSPITAL ENCOUNTER (OUTPATIENT)
Dept: RADIOLOGY | Facility: CLINIC | Age: 60
Discharge: HOME | End: 2025-04-03
Payer: MEDICARE

## 2025-04-03 DIAGNOSIS — Z87.891 PERSONAL HISTORY OF NICOTINE DEPENDENCE: ICD-10-CM

## 2025-04-03 PROCEDURE — 71271 CT THORAX LUNG CANCER SCR C-: CPT

## 2025-04-03 PROCEDURE — 71271 CT THORAX LUNG CANCER SCR C-: CPT | Performed by: RADIOLOGY

## 2025-04-06 ASSESSMENT — DERMATOLOGY QUALITY OF LIFE (QOL) ASSESSMENT
RATE HOW EMOTIONALLY BOTHERED YOU ARE BY YOUR SKIN PROBLEM (FOR EXAMPLE, WORRY, EMBARRASSMENT, FRUSTRATION): 6 - ALWAYS BOTHERED
RATE HOW BOTHERED YOU ARE BY EFFECTS OF YOUR SKIN PROBLEMS ON YOUR ACTIVITIES (EG, GOING OUT, ACCOMPLISHING WHAT YOU WANT, WORK ACTIVITIES OR YOUR RELATIONSHIPS WITH OTHERS): 6 - ALWAYS BOTHERED
RATE HOW BOTHERED YOU ARE BY SYMPTOMS OF YOUR SKIN PROBLEM (EG, ITCHING, STINGING BURNING, HURTING OR SKIN IRRITATION): 3
RATE HOW BOTHERED YOU ARE BY EFFECTS OF YOUR SKIN PROBLEMS ON YOUR ACTIVITIES (EG, GOING OUT, ACCOMPLISHING WHAT YOU WANT, WORK ACTIVITIES OR YOUR RELATIONSHIPS WITH OTHERS): 6 - ALWAYS BOTHERED
RATE HOW EMOTIONALLY BOTHERED YOU ARE BY YOUR SKIN PROBLEM (FOR EXAMPLE, WORRY, EMBARRASSMENT, FRUSTRATION): 6 - ALWAYS BOTHERED
RATE HOW BOTHERED YOU ARE BY SYMPTOMS OF YOUR SKIN PROBLEM (EG, ITCHING, STINGING BURNING, HURTING OR SKIN IRRITATION): 3

## 2025-04-07 ENCOUNTER — APPOINTMENT (OUTPATIENT)
Dept: DERMATOLOGY | Facility: CLINIC | Age: 60
End: 2025-04-07
Payer: MEDICARE

## 2025-04-07 DIAGNOSIS — M32.9 SYSTEMIC LUPUS ERYTHEMATOSUS, UNSPECIFIED: ICD-10-CM

## 2025-04-07 DIAGNOSIS — D48.5 NEOPLASM OF UNCERTAIN BEHAVIOR OF SKIN: Primary | ICD-10-CM

## 2025-04-07 PROCEDURE — 99203 OFFICE O/P NEW LOW 30 MIN: CPT | Performed by: NURSE PRACTITIONER

## 2025-04-07 PROCEDURE — 1036F TOBACCO NON-USER: CPT | Performed by: NURSE PRACTITIONER

## 2025-04-07 RX ORDER — HYDROXYCHLOROQUINE SULFATE 200 MG/1
TABLET, FILM COATED ORAL
Qty: 135 TABLET | Refills: 0 | Status: SHIPPED | OUTPATIENT
Start: 2025-04-07

## 2025-04-07 NOTE — TELEPHONE ENCOUNTER
Prescription Request        Has The Patient Been Identified By Name And Date Of Birth: Yes    RX Requestor: Pharmacy    Date of Last Refill: 01/13/2025    Date Of Last Office Visit: 11/05/2024    Date Of Future Office Visit: 06/03/2025

## 2025-04-07 NOTE — PROGRESS NOTES
Subjective     Christa Christie is a 59 y.o. female who presents for the following: Suspicious Skin Lesion (Pt presents to office for evaluation of facial lesion of one years duration. Pt denies and personal or family history of skin cancer. Pt does have lupus. Pt used triamcinolone cream to lesion with no effect. Pt denies pain or itching or bleeding to area. ).     Review of Systems:  No other skin or systemic complaints other than what is documented elsewhere in the note.    The following portions of the chart were reviewed this encounter and updated as appropriate:  Tobacco  Allergies  Meds  Problems  Med Hx  Surg Hx  Fam Hx       Skin Cancer History  No skin cancer on file.    Specialty Problems          Dermatology Problems    Discoid lupus erythematosus    Discoid lupus erythematosus of left eyelid    Discoid lupus erythematosus of right eyelid     Past Medical History:  Christa Christie  has a past medical history of Anxiety, Coronary artery calcification seen on CT scan (2024), Coronary artery disease involving native coronary artery of native heart without angina pectoris (2024), Discoid lupus, Encounter for gynecological examination (general) (routine) without abnormal findings (2019), Encounter for gynecological examination (general) (routine) without abnormal findings (2019), GERD (gastroesophageal reflux disease), Hearing aid worn, HL (hearing loss), Hyperlipidemia, Hypertension, Immunosuppression, Joint pain, Lung nodule, Lupus, Panic disorder (episodic paroxysmal anxiety), Personal history of other diseases of the nervous system and sense organs, Personal history of other specified conditions, Respiratory complication, postoperative, Unspecified asthma, uncomplicated ([facility]), and Upper respiratory tract infection.    Past Surgical History:  Christa Christie  has a past surgical history that includes Back surgery (10/22/2014);  section, classic (2022);  Other surgical history (06/10/2020); Other surgical history (06/10/2020); and Colonoscopy.    Family History:  Patient family history includes Lung cancer (age of onset: 85) in her mother; cardiac disorder in her father and mother.    Social History:  Christa Christie  reports that she quit smoking about 8 years ago. Her smoking use included cigarettes. She started smoking about 38 years ago. She has a 30 pack-year smoking history. She has been exposed to tobacco smoke. She has never used smokeless tobacco. She reports current alcohol use of about 2.0 standard drinks of alcohol per week. She reports that she does not use drugs.    Allergies:  Patient has no known allergies.    Current Medications / CAM's:    Current Outpatient Medications:     aspirin 81 mg EC tablet, Take 1 tablet (81 mg) by mouth once daily., Disp: , Rfl:     atorvastatin (Lipitor) 40 mg tablet, TAKE 1 TABLET BY MOUTH EVERY DAY, Disp: 90 tablet, Rfl: 1    docusate sodium (Colace) 100 mg capsule, Take 1 capsule (100 mg) by mouth 2 times a day as needed for constipation., Disp: 20 capsule, Rfl: 0    esomeprazole (NexIUM) 20 mg DR capsule, TAKE 1 CAPSULE BY MOUTH ONCE DAILY BEFORE EATING, Disp: 90 capsule, Rfl: 3    hydroxychloroquine (Plaquenil) 200 mg tablet, TAKE ONE & ONE-HALF TABLETS BY MOUTH DAILY, Disp: 135 tablet, Rfl: 0    metoprolol succinate XL (Toprol-XL) 50 mg 24 hr tablet, TAKE 1 TABLET BY MOUTH EVERY DAY, Disp: 90 tablet, Rfl: 3    sucralfate (Carafate) 1 gram tablet, TAKE 1 TABLET (1 G) BY MOUTH 2 TIMES A DAY BEFORE MEALS., Disp: 180 tablet, Rfl: 0    triamcinolone (Kenalog) 0.1 % cream, APPLY AND RUB IN A THIN FILM TO AFFECTED AREAS TWICE DAILY.(AM AND PM). (Patient not taking: Reported on 12/24/2024), Disp: , Rfl:      Objective   Well appearing patient in no apparent distress; mood and affect are within normal limits.    A focused skin examination was performed. All findings within normal limits unless otherwise noted  below.    Assessment/Plan   1. Neoplasm of uncertain behavior of skin  Right Zygomatic Area  Solitary 1.0 cm light brown/pink patch with trailing scale. Polymorphic vessels and non-uniform perifollicular pigment. Surrounding skin is hypopigmented with a few light brown macules. Appeared over 1 year ago as a papule, growing since. The lesion is asymptomatic, slowly growing for 1 year.               Lesion biopsy  Type of biopsy: tangential    Informed consent: discussed and consent obtained    Timeout: patient name, date of birth, surgical site, and procedure verified    Procedure prep:  Patient was prepped and draped  Anesthesia: the lesion was anesthetized in a standard fashion    Anesthetic:  1% lidocaine w/ epinephrine 1-100,000 local infiltration  Instrument used: DermaBlade    Hemostasis achieved with: aluminum chloride    Outcome: patient tolerated procedure well    Post-procedure details: sterile dressing applied and wound care instructions given    Dressing type: petrolatum and bandage      Specimen 1 - Dermatopathology- DERM LAB  Differential Diagnosis: NMSC vs DLE vs other   Check Margins Yes/No?:    Comments:    Dermpath Lab: Routine Histopathology (formalin-fixed tissue)    -  Discussed differential with patient.   - Given uncertainty of clinical diagnosis, a biopsy is recommended in clinic today.   - The patient expressed understanding, is in agreement with this plan, and wishes to proceed with biopsy.   - Oral and written wound care instructions provided.   - Advised the patient that the office will call within 2 weeks to discuss biopsy results.     Please call me if there are any changes or development of concerning symptoms (lesion/skin condition is changing, bleeding, enlarging, or worsening).

## 2025-04-07 NOTE — LETTER
April 7, 2025     Troy Da Silva DO  [address]  [address]  [facility] [address]    Patient: Christa Christie   YOB: 1965   Date of Visit: 4/7/2025       Dear Dr. Tory Da Silva DO:    Thank you for referring Christa Christie to me for evaluation. Below are my notes for this consultation.  If you have questions, please do not hesitate to call me. I look forward to following your patient along with you.       Sincerely,     Susan L Mayne, APRN-CNP      CC: No Recipients  ______________________________________________________________________________________    Subjective    Christa Christie is a 59 y.o. female who presents for the following: Suspicious Skin Lesion (Pt presents to office for evaluation of facial lesion of one years duration. Pt denies and personal or family history of skin cancer. Pt does have lupus. Pt used triamcinolone cream to lesion with no effect. Pt denies pain or itching or bleeding to area. ).     Review of Systems:  No other skin or systemic complaints other than what is documented elsewhere in the note.    The following portions of the chart were reviewed this encounter and updated as appropriate:       Skin Cancer History  No skin cancer on file.    Specialty Problems          Dermatology Problems    Discoid lupus erythematosus    Discoid lupus erythematosus of left eyelid    Discoid lupus erythematosus of right eyelid     Past Medical History:  Christa Christie  has a past medical history of Anxiety, Coronary artery calcification seen on CT scan (05/08/2024), Coronary artery disease involving native coronary artery of native heart without angina pectoris (05/08/2024), Discoid lupus, Encounter for gynecological examination (general) (routine) without abnormal findings (08/19/2019), Encounter for gynecological examination (general) (routine) without abnormal findings (08/19/2019), GERD (gastroesophageal reflux disease), Hearing aid worn, HL (hearing loss), Hyperlipidemia,  Hypertension, Immunosuppression, Joint pain, Lung nodule, Lupus, Panic disorder (episodic paroxysmal anxiety), Personal history of other diseases of the nervous system and sense organs, Personal history of other specified conditions, Respiratory complication, postoperative, Unspecified asthma, uncomplicated ([facility]), and Upper respiratory tract infection.    Past Surgical History:  Christa Christie  has a past surgical history that includes Back surgery (10/22/2014);  section, classic (2022); Other surgical history (06/10/2020); Other surgical history (06/10/2020); and Colonoscopy.    Family History:  Patient family history includes Lung cancer (age of onset: 85) in her mother; cardiac disorder in her father and mother.    Social History:  Christa Christie  reports that she quit smoking about 8 years ago. Her smoking use included cigarettes. She started smoking about 38 years ago. She has a 30 pack-year smoking history. She has been exposed to tobacco smoke. She has never used smokeless tobacco. She reports current alcohol use of about 2.0 standard drinks of alcohol per week. She reports that she does not use drugs.    Allergies:  Patient has no known allergies.    Current Medications / CAM's:    Current Outpatient Medications:   •  aspirin 81 mg EC tablet, Take 1 tablet (81 mg) by mouth once daily., Disp: , Rfl:   •  atorvastatin (Lipitor) 40 mg tablet, TAKE 1 TABLET BY MOUTH EVERY DAY, Disp: 90 tablet, Rfl: 1  •  docusate sodium (Colace) 100 mg capsule, Take 1 capsule (100 mg) by mouth 2 times a day as needed for constipation., Disp: 20 capsule, Rfl: 0  •  esomeprazole (NexIUM) 20 mg DR capsule, TAKE 1 CAPSULE BY MOUTH ONCE DAILY BEFORE EATING, Disp: 90 capsule, Rfl: 3  •  hydroxychloroquine (Plaquenil) 200 mg tablet, TAKE ONE & ONE-HALF TABLETS BY MOUTH DAILY, Disp: 135 tablet, Rfl: 0  •  metoprolol succinate XL (Toprol-XL) 50 mg 24 hr tablet, TAKE 1 TABLET BY MOUTH EVERY DAY, Disp: 90 tablet, Rfl:  3  •  sucralfate (Carafate) 1 gram tablet, TAKE 1 TABLET (1 G) BY MOUTH 2 TIMES A DAY BEFORE MEALS., Disp: 180 tablet, Rfl: 0  •  triamcinolone (Kenalog) 0.1 % cream, APPLY AND RUB IN A THIN FILM TO AFFECTED AREAS TWICE DAILY.(AM AND PM). (Patient not taking: Reported on 12/24/2024), Disp: , Rfl:      Objective  Well appearing patient in no apparent distress; mood and affect are within normal limits.    A focused skin examination was performed. All findings within normal limits unless otherwise noted below.    Assessment/Plan  1. Neoplasm of uncertain behavior of skin  Right Zygomatic Area  Solitary 1.0 cm light brown/pink patch with trailing scale. Polymorphic vessels and non-uniform perifollicular pigment. Surrounding skin is hypopigmented with a few light brown macules. Appeared over 1 year ago as a papule, growing since. The lesion is asymptomatic, slowly growing for 1 year.               Lesion biopsy  Type of biopsy: tangential    Informed consent: discussed and consent obtained    Timeout: patient name, date of birth, surgical site, and procedure verified    Procedure prep:  Patient was prepped and draped  Anesthesia: the lesion was anesthetized in a standard fashion    Anesthetic:  1% lidocaine w/ epinephrine 1-100,000 local infiltration  Instrument used: DermaBlade    Hemostasis achieved with: aluminum chloride    Outcome: patient tolerated procedure well    Post-procedure details: sterile dressing applied and wound care instructions given    Dressing type: petrolatum and bandage      Specimen 1 - Dermatopathology- DERM LAB  Differential Diagnosis: NMSC vs other (?) PMH of lupus  Check Margins Yes/No?:    Comments:    Dermpath Lab: Routine Histopathology (formalin-fixed tissue)    -  Discussed differential with patient.   - Given uncertainty of clinical diagnosis, a biopsy is recommended in clinic today.   - The patient expressed understanding, is in agreement with this plan, and wishes to proceed with  biopsy.   - Oral and written wound care instructions provided.   - Advised the patient that the office will call within 2 weeks to discuss biopsy results.

## 2025-04-08 ENCOUNTER — TELEPHONE (OUTPATIENT)
Dept: PRIMARY CARE | Facility: CLINIC | Age: 60
End: 2025-04-08
Payer: MEDICARE

## 2025-04-08 NOTE — TELEPHONE ENCOUNTER
----- Message from Troy Da Silva sent at 4/7/2025  6:18 PM EDT -----  Let patient know lung cancer screening test shows stable.  Continued yearly screening recommended  Only other incidental finding relates to coronary artery disease which we are already aware of and she continues to follow with cardiology

## 2025-04-09 ENCOUNTER — HOSPITAL ENCOUNTER (OUTPATIENT)
Dept: RADIOLOGY | Facility: CLINIC | Age: 60
Discharge: HOME | End: 2025-04-09
Payer: MEDICARE

## 2025-04-09 DIAGNOSIS — H90.3 BILATERAL SENSORINEURAL HEARING LOSS: ICD-10-CM

## 2025-04-09 DIAGNOSIS — Z96.21 COCHLEAR IMPLANT IN PLACE: ICD-10-CM

## 2025-04-09 LAB
LABORATORY COMMENT REPORT: NORMAL
PATH REPORT.FINAL DX SPEC: NORMAL
PATH REPORT.GROSS SPEC: NORMAL
PATH REPORT.RELEVANT HX SPEC: NORMAL
PATH REPORT.TOTAL CANCER: NORMAL

## 2025-04-09 PROCEDURE — 70480 CT ORBIT/EAR/FOSSA W/O DYE: CPT

## 2025-04-10 DIAGNOSIS — L08.9 LOCAL INFECTION OF SKIN AND SUBCUTANEOUS TISSUE: Primary | ICD-10-CM

## 2025-04-10 RX ORDER — MUPIROCIN 20 MG/G
OINTMENT TOPICAL
Qty: 22 G | Refills: 1 | Status: SHIPPED | OUTPATIENT
Start: 2025-04-10 | End: 2025-04-20

## 2025-04-18 DIAGNOSIS — R19.4 BOWEL HABIT CHANGES: ICD-10-CM

## 2025-04-19 DIAGNOSIS — K21.9 GASTRO-ESOPHAGEAL REFLUX DISEASE WITHOUT ESOPHAGITIS: ICD-10-CM

## 2025-04-20 RX ORDER — HYDROGEN PEROXIDE 3 %
20 SOLUTION, NON-ORAL MISCELLANEOUS
Qty: 90 CAPSULE | Refills: 3 | Status: SHIPPED | OUTPATIENT
Start: 2025-04-20

## 2025-04-20 RX ORDER — SUCRALFATE 1 G/1
1 TABLET ORAL
Qty: 180 TABLET | Refills: 0 | Status: SHIPPED | OUTPATIENT
Start: 2025-04-20 | End: 2026-04-20

## 2025-04-28 ENCOUNTER — APPOINTMENT (OUTPATIENT)
Dept: PRIMARY CARE | Facility: CLINIC | Age: 60
End: 2025-04-28
Payer: MEDICARE

## 2025-04-28 VITALS
BODY MASS INDEX: 25.23 KG/M2 | OXYGEN SATURATION: 96 % | HEART RATE: 63 BPM | TEMPERATURE: 97.5 F | SYSTOLIC BLOOD PRESSURE: 138 MMHG | WEIGHT: 147.8 LBS | DIASTOLIC BLOOD PRESSURE: 80 MMHG | HEIGHT: 64 IN

## 2025-04-28 DIAGNOSIS — Z00.00 ROUTINE GENERAL MEDICAL EXAMINATION AT HEALTH CARE FACILITY: Primary | ICD-10-CM

## 2025-04-28 DIAGNOSIS — Z13.220 SCREENING FOR HYPERLIPIDEMIA: ICD-10-CM

## 2025-04-28 DIAGNOSIS — Z12.31 ENCOUNTER FOR SCREENING MAMMOGRAM FOR BREAST CANCER: ICD-10-CM

## 2025-04-28 DIAGNOSIS — R73.01 ELEVATED FASTING GLUCOSE: ICD-10-CM

## 2025-04-28 PROCEDURE — 3079F DIAST BP 80-89 MM HG: CPT | Performed by: FAMILY MEDICINE

## 2025-04-28 PROCEDURE — G0439 PPPS, SUBSEQ VISIT: HCPCS | Performed by: FAMILY MEDICINE

## 2025-04-28 PROCEDURE — 1036F TOBACCO NON-USER: CPT | Performed by: FAMILY MEDICINE

## 2025-04-28 PROCEDURE — 3075F SYST BP GE 130 - 139MM HG: CPT | Performed by: FAMILY MEDICINE

## 2025-04-28 PROCEDURE — 99214 OFFICE O/P EST MOD 30 MIN: CPT | Performed by: FAMILY MEDICINE

## 2025-04-28 PROCEDURE — 3008F BODY MASS INDEX DOCD: CPT | Performed by: FAMILY MEDICINE

## 2025-04-28 RX ORDER — MUPIROCIN 20 MG/G
OINTMENT TOPICAL
COMMUNITY
Start: 2025-04-10

## 2025-04-28 ASSESSMENT — ENCOUNTER SYMPTOMS
EYE PAIN: 0
DIARRHEA: 0
SINUS PRESSURE: 0
WOUND: 0
FATIGUE: 0
VOMITING: 0
COUGH: 0
ARTHRALGIAS: 0
BLOOD IN STOOL: 0
SHORTNESS OF BREATH: 0
NAUSEA: 0
HEADACHES: 0
DYSURIA: 0
DYSPHORIC MOOD: 0
SLEEP DISTURBANCE: 0
ABDOMINAL PAIN: 0
NERVOUS/ANXIOUS: 0
CONSTIPATION: 0
ACTIVITY CHANGE: 0
JOINT SWELLING: 0
LIGHT-HEADEDNESS: 0
DIZZINESS: 0
PALPITATIONS: 0
APPETITE CHANGE: 0

## 2025-04-28 ASSESSMENT — ACTIVITIES OF DAILY LIVING (ADL)
BATHING: INDEPENDENT
MANAGING_FINANCES: INDEPENDENT
DRESSING: INDEPENDENT
GROCERY_SHOPPING: INDEPENDENT
MANAGING_FINANCES: INDEPENDENT
GROCERY_SHOPPING: INDEPENDENT
TAKING_MEDICATION: INDEPENDENT
DOING_HOUSEWORK: INDEPENDENT
DOING_HOUSEWORK: INDEPENDENT
TAKING_MEDICATION: INDEPENDENT

## 2025-04-28 ASSESSMENT — PATIENT HEALTH QUESTIONNAIRE - PHQ9
SUM OF ALL RESPONSES TO PHQ9 QUESTIONS 1 AND 2: 0
2. FEELING DOWN, DEPRESSED OR HOPELESS: NOT AT ALL
1. LITTLE INTEREST OR PLEASURE IN DOING THINGS: NOT AT ALL
SUM OF ALL RESPONSES TO PHQ9 QUESTIONS 1 AND 2: 0
1. LITTLE INTEREST OR PLEASURE IN DOING THINGS: NOT AT ALL
2. FEELING DOWN, DEPRESSED OR HOPELESS: NOT AT ALL

## 2025-04-28 NOTE — PATIENT INSTRUCTIONS
I recommend RSV vaccine and Shingrix at the pharmacy.      Recommendations for women annual wellness exam:  Make sure screenings for cervical, breast, and colon cancer are up to date if applicable- pap smears age 21-65, discuss mammogram starting at age 40, colonoscopy at age 45, earlier if positive family history for breast or colon cancer  Screen for osteoporosis with DXA bone scan starting at age 65 or sooner if risk factors present (long term steroid use, smoking, heavy alcohol use, history of fracture, rheumatoid arthritis, low body weight, family history of hip fracture)  Screening for lung cancer with low-dose CT in all adults age 55-77 who have a 30 pack-year smoking history and currently smoke or have quit within the past 15 years  Follow a healthy diet (Dash diet, Mediterranean diet)  Exercise 150 min/wk  Maintain healthy weight (BMI < 25)  Do not smoke  Alcohol in moderation (up to 1 drink/day)  Get enough sleep (7-8 hours/night)  Make sure immunizations are up to date (influenza, pneumococcal, Tdap, shingles if age > 50)  Postmenopausal women or women with osteoporosis need 600- 1,200 mg calcium and 6626-5630 IU vitamin D daily  Talk to your physician if you have concerns about depression or anxiety  Visit dentist twice yearly

## 2025-04-28 NOTE — PROGRESS NOTES
Subjective   Reason for Visit: Christa Christie is an 59 y.o. female here for a Medicare Wellness visit.     **Care team  Rheumatology- Dr. Strauss- following for lupus - sx well controlled by pt report   optho- on Plaquenil   ENT/Audiology- following regularly due to chronic hearing loss.  Now doing extremely well status post cochlear implants  GI- Dr. Chang     GERD- had egd in past 2016. Symptoms have been better on on nexium   protonix didn't help   Working on food changes for constipation.      Hypertension- well-controlled on metoprolol. No concerning side effects     Hyperlipidemia and asymptomatic coronary artery atherosclerosis-tolerating aspirin and atorvastatin without any issues. No bleeding concerns     She states that over the last 6 months due to elevated fasting glucose she has significantly cleaned up her eating habits and is lost about 10 pounds    Lung nodules- hx smoking quit in 2017- due 3/26     mammo due   pap done 8/22 NILM/ HPV negative   Colonoscopy 2021- due 2026      Past Medical, Surgical, and Family History reviewed and updated in chart.    Reviewed all medications by prescribing practitioner or clinical pharmacist (such as prescriptions, OTCs, herbal therapies and supplements) and documented in the medical record.    HPI    Patient Self Assessment of Health Status  Patient Self Assessment: Fair    Nutrition and Exercise  Current Diet: Well Balanced Diet  Adequate Fluid Intake: Yes  Caffeine: Yes  Exercise Frequency: Regularly    Functional Ability/Level of Safety  Cognitive Impairment Observed: No cognitive impairment observed  Cognitive Impairment Reported: No cognitive impairment reported by patient or family    Home Safety Risk Factors: None    Patient Care Team:  Troy Da Silva DO as PCP - General  Troy Da Silva DO as PCP - [employer] PCP     Review of Systems   Constitutional:  Negative for activity change, appetite change and fatigue.   HENT:  Negative for  "congestion, postnasal drip and sinus pressure.    Eyes:  Negative for pain and visual disturbance.   Respiratory:  Negative for cough and shortness of breath.    Cardiovascular:  Negative for chest pain, palpitations and leg swelling.   Gastrointestinal:  Negative for abdominal pain, blood in stool, constipation, diarrhea, nausea and vomiting.   Endocrine: Negative for cold intolerance and heat intolerance.   Genitourinary:  Negative for dysuria and menstrual problem.   Musculoskeletal:  Negative for arthralgias and joint swelling.   Skin:  Negative for rash and wound.   Neurological:  Negative for dizziness, light-headedness and headaches.   Psychiatric/Behavioral:  Negative for dysphoric mood and sleep disturbance. The patient is not nervous/anxious.        Objective   Vitals:  /80   Pulse 63   Temp 36.4 °C (97.5 °F)   Ht 1.613 m (5' 3.5\")   Wt 67 kg (147 lb 12.8 oz)   SpO2 96%   BMI 25.77 kg/m²       Physical Exam  Vitals and nursing note reviewed.   Constitutional:       Appearance: Normal appearance. She is normal weight.   HENT:      Head: Normocephalic and atraumatic.      Right Ear: External ear normal.      Left Ear: External ear normal.      Ears:      Comments: Cochlear implants in place     Nose:      Right Sinus: No maxillary sinus tenderness or frontal sinus tenderness.      Left Sinus: No maxillary sinus tenderness or frontal sinus tenderness.      Mouth/Throat:      Mouth: Mucous membranes are moist.   Eyes:      Conjunctiva/sclera: Conjunctivae normal.   Cardiovascular:      Rate and Rhythm: Normal rate and regular rhythm.      Pulses: Normal pulses.      Heart sounds: Normal heart sounds.   Pulmonary:      Effort: Pulmonary effort is normal.      Breath sounds: Normal breath sounds.   Abdominal:      General: Abdomen is flat. Bowel sounds are normal.      Palpations: Abdomen is soft.   Musculoskeletal:         General: No swelling.      Cervical back: Normal range of motion and neck " supple.      Right lower leg: No edema.      Left lower leg: No edema.   Lymphadenopathy:      Cervical: No cervical adenopathy.   Skin:     General: Skin is warm and dry.      Capillary Refill: Capillary refill takes less than 2 seconds.   Neurological:      General: No focal deficit present.      Mental Status: She is alert. Mental status is at baseline.      Deep Tendon Reflexes: Reflexes normal.   Psychiatric:         Mood and Affect: Mood normal.         Behavior: Behavior normal.         Thought Content: Thought content normal.         Judgment: Judgment normal.         Assessment/Plan   Problem List Items Addressed This Visit       Elevated fasting glucose    Relevant Orders    Hemoglobin A1C - Lab collect     Other Visit Diagnoses         Routine general medical examination at health care facility    -  Primary    Relevant Orders    1 Year Follow Up In Primary Care - Wellness Exam    Comprehensive Metabolic Panel      Encounter for screening mammogram for breast cancer        Relevant Orders    BI mammo bilateral screening tomosynthesis      Screening for hyperlipidemia        Relevant Orders    Lipid Panel            Overall doing well   Updates as above  Continue to work on healthy diet and exercise  Lung nodule ct due 3/26

## 2025-04-30 LAB
ALBUMIN SERPL-MCNC: 4.3 G/DL (ref 3.6–5.1)
ALP SERPL-CCNC: 70 U/L (ref 37–153)
ALT SERPL-CCNC: 17 U/L (ref 6–29)
ANION GAP SERPL CALCULATED.4IONS-SCNC: 8 MMOL/L (CALC) (ref 7–17)
AST SERPL-CCNC: 26 U/L (ref 10–35)
BILIRUB SERPL-MCNC: 0.7 MG/DL (ref 0.2–1.2)
BUN SERPL-MCNC: 12 MG/DL (ref 7–25)
CALCIUM SERPL-MCNC: 9.5 MG/DL (ref 8.6–10.4)
CHLORIDE SERPL-SCNC: 102 MMOL/L (ref 98–110)
CHOLEST SERPL-MCNC: 138 MG/DL
CHOLEST/HDLC SERPL: 1.9 (CALC)
CO2 SERPL-SCNC: 30 MMOL/L (ref 20–32)
CREAT SERPL-MCNC: 0.75 MG/DL (ref 0.5–1.05)
EGFRCR SERPLBLD CKD-EPI 2021: 91 ML/MIN/1.73M2
EST. AVERAGE GLUCOSE BLD GHB EST-MCNC: 114 MG/DL
EST. AVERAGE GLUCOSE BLD GHB EST-SCNC: 6.3 MMOL/L
GLUCOSE SERPL-MCNC: 92 MG/DL (ref 65–99)
HBA1C MFR BLD: 5.6 %
HDLC SERPL-MCNC: 72 MG/DL
LDLC SERPL CALC-MCNC: 53 MG/DL (CALC)
NONHDLC SERPL-MCNC: 66 MG/DL (CALC)
POTASSIUM SERPL-SCNC: 4 MMOL/L (ref 3.5–5.3)
PROT SERPL-MCNC: 6.7 G/DL (ref 6.1–8.1)
SODIUM SERPL-SCNC: 140 MMOL/L (ref 135–146)
TRIGL SERPL-MCNC: 51 MG/DL

## 2025-05-02 ENCOUNTER — APPOINTMENT (OUTPATIENT)
Dept: AUDIOLOGY | Facility: CLINIC | Age: 60
End: 2025-05-02
Payer: MEDICARE

## 2025-05-02 DIAGNOSIS — H93.13 TINNITUS OF BOTH EARS: ICD-10-CM

## 2025-05-02 DIAGNOSIS — H90.3 SENSORINEURAL HEARING LOSS, BILATERAL: Primary | ICD-10-CM

## 2025-05-02 DIAGNOSIS — R26.89 BALANCE DISORDER: ICD-10-CM

## 2025-05-02 PROCEDURE — 92604 REPROGRAM COCHLEAR IMPLT 7/>: CPT | Performed by: AUDIOLOGIST

## 2025-05-02 PROCEDURE — 92567 TYMPANOMETRY: CPT | Performed by: AUDIOLOGIST

## 2025-05-02 NOTE — Clinical Note
Pt had IT and ATT done today. Results pending. Continue flat tymp on right, impedance fluctuating, lightheadedness - intermittently. She said she hasn't heard anything about her CT results

## 2025-05-02 NOTE — PROGRESS NOTES
"COCHLEAR IMPLANT IT and ATT RIGHT CI         Name:  Christa Christie  :  1965  Age:  59 y.o.  Date of Evaluation:  2025     RIGHT DEVICE  External Device: Cochlear [id number] (N8) sound processor  Internal Device: [employer]ar [employer]  [id number]   Surgeon: Corrina Pacheco MD  Implantation Date: 2024  Activation Date: 2024    LEFT DEVICE  External Device: Cochlear [id number] (N8) sound processor  Internal Device: [employer]  [id number]  Surgeon: Corrina Pacheco MD  Implantation Date: 2024  Activation Date: 2025    HISTORY  Christa Christie arrives today for the third stimulation of the Co[employer]ar [employer]  cochlear implantation (SN: [id number]) in the left cochlea used in conjunction with a Cochlear [id number] (N8) sound processor.  She is a bilateral sequential CI recipient. She reports continued lightheadedness and imbalance. No longer has shooting pain in the right ear. Reports she hears \"stereo\" from the left side but the right ear sounds \"mono\". She arrives today for an integrity testing with Padmini ROSS and Advanced Telemetry Test (ATT) with VANDANA Martinez both from Cochlear. Had CT scan, results pending.    Recall: Mrs. Christie had moderate to profound sensorineural hearing loss which was progressive. Right ear was worse (profound) when she obtained a cochlear implant in  and then had a left CI in later .    LEFT PROGRAMMING  Headset pressure, magnet strength (3I), and incision site were checked with no problems noted. Datalogging revealed 12+ hours of use per day with 2+ hours in speech, on average since the last appointment.     Electrode impedances, used to monitor internal device function, were measured across the electrode array.  Impedance measures were within normal limits for all electrodes, in each stimulation mode. Current parameters of MP1+2 stimulation mode, an ACE speech processing strategy, 8 maxima, PW 37, and a 900 Hz stimulation " rate were maintained. Programming consisted of using the population mean protocol. Programming began from MAP 19.  Swept Comfort (C) levels and increased until loud but comfortable Patient reports speech is loud but comfortable.  Swept C levels with no nonauditory stimulation observed. Current programming consists of:    PROGRAM MAP SMARTSOUND WNR+SNR MICROPHONE VOLUME SENSITIVITY COMMENTS   1 MAP 21 SCAN2 Enabled Standard  Fixed  Adaptive 6 12 SCAN   2 MAP 21 ADRO+AutoS Enabled Fixed 6 10 Resturants   3 MAP 21 ADRO+AutoS Enabled Adaptive 6 8 Groups #3   4 MAP 21 ADRO+Whisper Enabled Standard 6 12 Music     RIGHT PROGRAMMING  Headset pressure, magnet strength (3I), and incision site were checked with no problems noted. Datalogging revealed 14+ hours of use per day with 2+ hours in speech, on average since the last appointment.     Electrode impedances, used to monitor internal device function, were measured across the electrode array.  Impedance measures were within normal limits although fluctuating for all electrodes, in each stimulation mode. Programming began from MAP 15. Levels are out of compliance for channels 9, 10, 12 and 17. Programming began from MAP 20. Current parameters of MP1+2 stimulation mode, an ACE speech processing strategy, 8 maxima, PW 37 increased to 50 and a 900 Hz stimulation rate were maintained.Threshold (T) levels were measured using a up-down bracketing on a variety of channels with the remaining channels interpolated.  Comfort (C) levels were measured on a variety of channels with the remaining channels interpolated. Current programming consists of:    PROGRAM MAP SMARTSOUND WNR+SNR MICROPHONE VOLUME SENSITIVITY COMMENTS   1 MAP 22 SCAN2 Enabled Standard  Fixed  Adaptive 6 12 SCAN   2 MAP 22 ADRO+AutoS Enabled Fixed 6 10 Resturants   3 MAP 22 ADRO+AutoS Enabled Adaptive 6 8 Groups #3   4 MAP 22 ADRO+Whisper Enabled Standard 6 12 Music       UNAIDED TESTING  LEFT: Tympanometry  revealed normal middle ear pressure, mobility, and ear canal volume.     RIGHT: Tympanometry revealed normal ear canal volume with limited mobility of middle ear.    IMPRESSIONS  Positive stimulation on all active electrodes.  A reliable psychophysical MAP was defined in the ACE speech processing strategy. Programming completed with psychophysical loudness scaling and increased PW to 50 today on the right side.     RIGHT: Tympanometry revealed normal ear canal volume with limited mobility of middle ear. Continued fluctuating impedance, Advanced Telemetry Test and Integrity Testing completed with Cochlear personnel present during todays appointment. No longer having shooting pain in right ear but does have continued lightheadedness.    RECOMMENDATIONS  1. Continue medical follow-up with your neuro-otologist (Dr. Verdugo, Dr. Vleez, or Dr. Pacheco)  2. Utilize the bilateral Cochlear [employer] Cochlear [id number] (N8) sound processor speech processor daily using the most tolerated program.   3. Return in August, 2025 for annual CI appointment for right CI.   4. Utilize aural rehabilitation/auditory training programs, books on tape, and written materials at home to improve speech understanding ability.  5. Communication strategies were discussed (utilizing visual cues/gestures; reducing background noise and distance from desired source; increasing light to assist with visual cues; use of clear speech).  6. Contact [facility] CI Team auditory-verbal therapist to initiate aural rehabilitation therapy.    VANDANA Wu, CCC-A  Senior Clinical Audiologist    TIME: [phone]

## 2025-05-06 ENCOUNTER — APPOINTMENT (OUTPATIENT)
Dept: RHEUMATOLOGY | Facility: CLINIC | Age: 60
End: 2025-05-06
Payer: MEDICARE

## 2025-05-13 ENCOUNTER — APPOINTMENT (OUTPATIENT)
Dept: RADIOLOGY | Facility: CLINIC | Age: 60
End: 2025-05-13
Payer: MEDICARE

## 2025-05-13 DIAGNOSIS — Z12.31 ENCOUNTER FOR SCREENING MAMMOGRAM FOR BREAST CANCER: ICD-10-CM

## 2025-05-13 PROCEDURE — 77063 BREAST TOMOSYNTHESIS BI: CPT

## 2025-05-13 PROCEDURE — 77063 BREAST TOMOSYNTHESIS BI: CPT | Performed by: RADIOLOGY

## 2025-05-13 PROCEDURE — 77067 SCR MAMMO BI INCL CAD: CPT | Performed by: RADIOLOGY

## 2025-06-02 ASSESSMENT — RHEUMATOLOGY NEW PATIENT QUESTIONNAIRE
MUSCLE WEAKNESS: Y
MORNING STIFFNESS IN LOWER BACK: Y
MORNING STIFFNESS: Y
JOINT PAIN: Y
HOW WOULD YOU DESCRIBE YOUR STIFFNESS ON AVERAGE: MODERATE
ANXIETY: Y
NUMBNESS OR TINGLING IN HANDS OR FEET: Y
EYE DRYNESS: Y
JOINT SWELLING: Y

## 2025-06-03 ENCOUNTER — APPOINTMENT (OUTPATIENT)
Dept: RHEUMATOLOGY | Facility: CLINIC | Age: 60
End: 2025-06-03
Payer: MEDICARE

## 2025-06-03 VITALS
HEART RATE: 66 BPM | WEIGHT: 147.31 LBS | DIASTOLIC BLOOD PRESSURE: 73 MMHG | BODY MASS INDEX: 25.69 KG/M2 | OXYGEN SATURATION: 95 % | SYSTOLIC BLOOD PRESSURE: 130 MMHG

## 2025-06-03 DIAGNOSIS — E55.9 VITAMIN D DEFICIENCY: ICD-10-CM

## 2025-06-03 DIAGNOSIS — L93.1 SUBACUTE CUTANEOUS LUPUS ERYTHEMATOSUS: Primary | ICD-10-CM

## 2025-06-03 DIAGNOSIS — M81.0 AGE RELATED OSTEOPOROSIS, UNSPECIFIED PATHOLOGICAL FRACTURE PRESENCE: ICD-10-CM

## 2025-06-03 PROCEDURE — 3075F SYST BP GE 130 - 139MM HG: CPT | Performed by: INTERNAL MEDICINE

## 2025-06-03 PROCEDURE — 1036F TOBACCO NON-USER: CPT | Performed by: INTERNAL MEDICINE

## 2025-06-03 PROCEDURE — 99214 OFFICE O/P EST MOD 30 MIN: CPT | Performed by: INTERNAL MEDICINE

## 2025-06-03 PROCEDURE — 3078F DIAST BP <80 MM HG: CPT | Performed by: INTERNAL MEDICINE

## 2025-06-03 ASSESSMENT — ENCOUNTER SYMPTOMS
OCCASIONAL FEELINGS OF UNSTEADINESS: 1
LOSS OF SENSATION IN FEET: 0
DEPRESSION: 0

## 2025-06-03 NOTE — PROGRESS NOTES
Initial Rheumatology Patient Visit    Chief Complaint:  Christa Christie is a 59 y.o. female presenting today for New Patient Visit.    History of Presenting Problem:   59 y.o. female with Discoid Lupus present for evaluation.  Patient reports she was diagnosed in 2007 after a biopsy of the skin lesion was done.  She has a history of spinal stenosis status postsurgery in her neck and lower back she does deal with a chronic left foot drop.  She deals with intermittent neuropathy in her hands and feet.    Problem List:   Patient Active Problem List   Diagnosis    Anxiety    Bilateral asymmetric sensorineural hearing loss    Cervical spinal stenosis    Depression    Discoid lupus erythematosus of left eyelid    Discoid lupus erythematosus of right eyelid    GERD (gastroesophageal reflux disease)    Hallux rigidus of right foot    Hematuria, microscopic    Hypertension    Back pain with radiation    Lung nodule    Medication side effect    Neuritis    OAB (overactive bladder)    Systemic lupus erythematosus (Multi)    Bowel habit changes    Acute midline low back pain with right-sided sciatica    Mixed hyperlipidemia    Anterolisthesis of lumbar spine    Sacroiliac pain    Severe rt groin pain    Trochanteric bursitis of right hip    Discoid lupus erythematosus    History of substance dependence (Multi)    Coronary artery calcification seen on CT scan    Sensory hearing loss, bilateral    Cognitive communication deficit    Other symbolic dysfunctions    Elevated fasting glucose    Postoperative state    Cochlear implant in place       Past Medical History:   Past Medical History:   Diagnosis Date    Anxiety     Arthritis     Coronary artery calcification seen on CT scan 05/08/2024    Coronary artery disease involving native coronary artery of native heart without angina pectoris 05/08/2024    Severe coronary calcification on CT 3/19/24    Discoid lupus     Encounter for gynecological examination (general) (routine)  without abnormal findings 2019    Pap test, as part of routine gynecological examination    Encounter for gynecological examination (general) (routine) without abnormal findings 2019    Pap test, as part of routine gynecological examination    GERD (gastroesophageal reflux disease)     Hearing aid worn     HL (hearing loss)     Hyperlipidemia     Hypertension     Immunosuppression     Hydroxychloroquine    Joint pain     Lung nodule     Lupus     Panic disorder (episodic paroxysmal anxiety)     Panic attack    Personal history of other diseases of the nervous system and sense organs     History of tinnitus    Personal history of other specified conditions     History of heartburn    Respiratory complication, postoperative     Scabies     Shingles     Tinnitus     Unspecified asthma, uncomplicated ([facility])     Asthmatic bronchitis    Upper respiratory tract infection     Varicella        Surgical History:   Past Surgical History:   Procedure Laterality Date    BACK SURGERY  10/22/2014    Back Surgery     SECTION, CLASSIC  2022     Section    COCHLEAR IMPLANT      COLONOSCOPY      OTHER SURGICAL HISTORY  06/10/2020    Tonsillectomy with adenoidectomy    OTHER SURGICAL HISTORY  06/10/2020    Foot surgery    SKIN BIOPSY          Allergies: No Known Allergies    Medications:   Current Outpatient Medications:     aspirin 81 mg EC tablet, Take 1 tablet (81 mg) by mouth once daily., Disp: , Rfl:     esomeprazole (NexIUM) 20 mg DR capsule, TAKE 1 CAPSULE BY MOUTH ONCE DAILY BEFORE EATING, Disp: 90 capsule, Rfl: 3    hydroxychloroquine (Plaquenil) 200 mg tablet, TAKE ONE & ONE-HALF TABLETS BY MOUTH DAILY, Disp: 135 tablet, Rfl: 0    metoprolol succinate XL (Toprol-XL) 50 mg 24 hr tablet, TAKE 1 TABLET BY MOUTH EVERY DAY, Disp: 90 tablet, Rfl: 3    sucralfate (Carafate) 1 gram tablet, Take 1 tablet (1 g) by mouth 2 times a day before meals., Disp: 180 tablet, Rfl: 0    atorvastatin (Lipitor)  40 mg tablet, TAKE 1 TABLET BY MOUTH EVERY DAY, Disp: 90 tablet, Rfl: 1    docusate sodium (Colace) 100 mg capsule, Take 1 capsule (100 mg) by mouth 2 times a day as needed for constipation. (Patient not taking: Reported on 6/3/2025), Disp: 20 capsule, Rfl: 0    mupirocin (Bactroban) 2 % ointment, APPLY TO AFFECTED AREA 3 TIMES A DAY FOR 10 DAYS (Patient not taking: Reported on 6/3/2025), Disp: , Rfl:     triamcinolone (Kenalog) 0.1 % cream, APPLY AND RUB IN A THIN FILM TO AFFECTED AREAS TWICE DAILY.(AM AND PM). (Patient not taking: Reported on 6/3/2025), Disp: , Rfl:     Review of Systems:   ROS: Denies Morning stiffness, She report occasional joint pain  in her fingers and Big toe, She report  dry eyes and mouth,Denies oral, nasal or genital ulcers, She report  sun sensitivity with Hive , She report Raynaud's phenomenon. Denies Uveitis or recent vision changes. Denies new rashes or Hx of Psoriasis, Denies alopecia,   Denies Chest pain/SOB, cough, Hx of asthma, Denies Fever/chills/sweats, She report chronic  Fatigue, Denies  weight loss  Denies abdominal pain, New onset Dyspepsia, dysphasia, nausea/vomiting/diarrhea, dark/tarry stools, blood in stools or urine. She report Chronic back pain.   Denies Hx of blood clot or miscarriages. Hx of easy bruising or bleeding. Denies Headaches, She report intermittent numbness and tingling in her hands , weakness.  All other systems have been reviewed and are negative for complaint.     Objective   Physical Examination:    Visit Vitals  /73 (BP Location: Right arm, Patient Position: Sitting)   Pulse 66   Wt 66.8 kg (147 lb 5 oz)   SpO2 95%   BMI 25.69 kg/m²   OB Status Postmenopausal   Smoking Status Former   BSA 1.73 m²        Gen: NAD, A&O x 3  HEENT: clear sclera and conjunctiva,     Musculoskeletal:   Neck; WNL, full ROM  Shoulder: WNL, full ROM  Elbow:WNL, full ROM, no effusion noted  Wrist and fingers;no active synovitis noted, Full ROM in the Wrist , Good fist and    Knees:  No effusions or crepitation, full ROM.  Hips; WNL, full ROM, Negative Desmond test  Ankle, Feet; WNL, full ROM    Skin: No rashes or lesions seen, no nail changes  Neuro: A&O x3, Normal Gait    Procedures :None    Orders:  Orders Placed This Encounter   Procedures    XR DEXA bone density    C3 Complement    C4 Complement    Anti-DNA Antibody, Double-Stranded    Urinalysis with Reflex Microscopic    Protein, Urine Random    Creatinine, Urine Random    Vitamin D 25-Hydroxy,Total (for eval of Vitamin D levels)    CBC and Auto Differential    C-Reactive Protein    Sedimentation Rate    Hepatic Function Panel    Creatinine    Calcium        Provider Impression:   Assessment/Plan   Encounter Diagnoses   Name Primary?    Subacute cutaneous lupus erythematosus Yes    Vitamin D deficiency     Age related osteoporosis, unspecified pathological fracture presence         59 y.o. female with Discoid Lupus present for evaluation.   -Continue hydroxychloroquine 200 mg daily.  Patient reports she is up-to-date with eye exams  -Obtain lupus serological testing  -Recommend a bone density screening given history of postmenopausal and multiple back surgeries.  - Encourage calcium and vitamin D intake  -Follow-up in 6 months or sooner if needed

## 2025-06-04 LAB
25(OH)D3+25(OH)D2 SERPL-MCNC: 59 NG/ML (ref 30–100)
ALBUMIN SERPL-MCNC: 4.7 G/DL (ref 3.6–5.1)
ALBUMIN/GLOB SERPL: 1.7 (CALC) (ref 1–2.5)
ALP SERPL-CCNC: 69 U/L (ref 37–153)
ALT SERPL-CCNC: 18 U/L (ref 6–29)
APPEARANCE UR: ABNORMAL
AST SERPL-CCNC: 25 U/L (ref 10–35)
BACTERIA #/AREA URNS HPF: ABNORMAL /HPF
BASOPHILS # BLD AUTO: 29 CELLS/UL (ref 0–200)
BASOPHILS NFR BLD AUTO: 0.6 %
BILIRUB DIRECT SERPL-MCNC: 0.2 MG/DL
BILIRUB INDIRECT SERPL-MCNC: 0.7 MG/DL (CALC) (ref 0.2–1.2)
BILIRUB SERPL-MCNC: 0.9 MG/DL (ref 0.2–1.2)
BILIRUB UR QL STRIP: NEGATIVE
C3 SERPL-MCNC: 117 MG/DL (ref 83–193)
C4 SERPL-MCNC: 19 MG/DL (ref 15–57)
CALCIUM SERPL-MCNC: 9.9 MG/DL (ref 8.6–10.4)
COLOR UR: YELLOW
CREAT SERPL-MCNC: 0.7 MG/DL (ref 0.5–1.05)
CREAT UR-MCNC: 32 MG/DL (ref 20–275)
CRP SERPL-MCNC: <3 MG/L
DSDNA AB SER-ACNC: <1 IU/ML
EGFRCR SERPLBLD CKD-EPI 2021: 99 ML/MIN/1.73M2
EOSINOPHIL # BLD AUTO: 82 CELLS/UL (ref 15–500)
EOSINOPHIL NFR BLD AUTO: 1.7 %
ERYTHROCYTE [DISTWIDTH] IN BLOOD BY AUTOMATED COUNT: 11.3 % (ref 11–15)
ERYTHROCYTE [SEDIMENTATION RATE] IN BLOOD BY WESTERGREN METHOD: 9 MM/H
GLOBULIN SER CALC-MCNC: 2.7 G/DL (CALC) (ref 1.9–3.7)
GLUCOSE UR QL STRIP: NEGATIVE
HCT VFR BLD AUTO: 39 % (ref 35–45)
HGB BLD-MCNC: 13.1 G/DL (ref 11.7–15.5)
HGB UR QL STRIP: NEGATIVE
HYALINE CASTS #/AREA URNS LPF: ABNORMAL /LPF
KETONES UR QL STRIP: NEGATIVE
LEUKOCYTE ESTERASE UR QL STRIP: ABNORMAL
LYMPHOCYTES # BLD AUTO: 922 CELLS/UL (ref 850–3900)
LYMPHOCYTES NFR BLD AUTO: 19.2 %
MCH RBC QN AUTO: 34 PG (ref 27–33)
MCHC RBC AUTO-ENTMCNC: 33.6 G/DL (ref 32–36)
MCV RBC AUTO: 101.3 FL (ref 80–100)
MONOCYTES # BLD AUTO: 514 CELLS/UL (ref 200–950)
MONOCYTES NFR BLD AUTO: 10.7 %
NEUTROPHILS # BLD AUTO: 3254 CELLS/UL (ref 1500–7800)
NEUTROPHILS NFR BLD AUTO: 67.8 %
NITRITE UR QL STRIP: NEGATIVE
PH UR STRIP: 5.5 [PH] (ref 5–8)
PLATELET # BLD AUTO: 180 THOUSAND/UL (ref 140–400)
PMV BLD REES-ECKER: 11.3 FL (ref 7.5–12.5)
PROT SERPL-MCNC: 7.4 G/DL (ref 6.1–8.1)
PROT UR QL STRIP: NEGATIVE
PROT UR-MCNC: 4 MG/DL (ref 5–24)
PROT/CREAT UR: 0.12 MG/MG CREAT (ref 0.02–0.18)
PROT/CREAT UR: 125 MG/G CREAT (ref 24–184)
RBC # BLD AUTO: 3.85 MILLION/UL (ref 3.8–5.1)
RBC #/AREA URNS HPF: ABNORMAL /HPF
SERVICE CMNT-IMP: ABNORMAL
SP GR UR STRIP: 1.01 (ref 1–1.03)
SQUAMOUS #/AREA URNS HPF: ABNORMAL /HPF
WBC # BLD AUTO: 4.8 THOUSAND/UL (ref 3.8–10.8)
WBC #/AREA URNS HPF: ABNORMAL /HPF

## 2025-06-10 ENCOUNTER — HOSPITAL ENCOUNTER (OUTPATIENT)
Dept: RADIOLOGY | Facility: CLINIC | Age: 60
End: 2025-06-10
Payer: MEDICARE

## 2025-06-19 ENCOUNTER — APPOINTMENT (OUTPATIENT)
Dept: RADIOLOGY | Facility: CLINIC | Age: 60
End: 2025-06-19
Payer: MEDICARE

## 2025-06-19 DIAGNOSIS — M81.0 AGE RELATED OSTEOPOROSIS, UNSPECIFIED PATHOLOGICAL FRACTURE PRESENCE: ICD-10-CM

## 2025-06-19 PROCEDURE — 77080 DXA BONE DENSITY AXIAL: CPT

## 2025-07-10 DIAGNOSIS — M32.9 SYSTEMIC LUPUS ERYTHEMATOSUS, UNSPECIFIED: ICD-10-CM

## 2025-07-10 RX ORDER — HYDROXYCHLOROQUINE SULFATE 200 MG/1
200 TABLET, FILM COATED ORAL DAILY
Qty: 90 TABLET | Refills: 0 | Status: SHIPPED | OUTPATIENT
Start: 2025-07-10 | End: 2025-10-08

## 2025-07-11 RX ORDER — HYDROXYCHLOROQUINE SULFATE 200 MG/1
TABLET, FILM COATED ORAL
Qty: 135 TABLET | OUTPATIENT
Start: 2025-07-11

## 2025-07-15 DIAGNOSIS — I25.10 ATHEROSCLEROTIC HEART DISEASE OF NATIVE CORONARY ARTERY WITHOUT ANGINA PECTORIS: ICD-10-CM

## 2025-07-15 DIAGNOSIS — R19.4 BOWEL HABIT CHANGES: ICD-10-CM

## 2025-07-15 DIAGNOSIS — I10 ESSENTIAL (PRIMARY) HYPERTENSION: ICD-10-CM

## 2025-07-15 RX ORDER — SUCRALFATE 1 G/1
1 TABLET ORAL
Qty: 180 TABLET | Refills: 0 | Status: SHIPPED | OUTPATIENT
Start: 2025-07-15 | End: 2026-07-15

## 2025-07-15 RX ORDER — ATORVASTATIN CALCIUM 40 MG/1
40 TABLET, FILM COATED ORAL DAILY
Qty: 90 TABLET | Refills: 1 | Status: SHIPPED | OUTPATIENT
Start: 2025-07-15 | End: 2025-10-13

## 2025-07-15 RX ORDER — METOPROLOL SUCCINATE 50 MG/1
50 TABLET, EXTENDED RELEASE ORAL DAILY
Qty: 90 TABLET | Refills: 3 | Status: SHIPPED | OUTPATIENT
Start: 2025-07-15

## 2025-08-15 ENCOUNTER — APPOINTMENT (OUTPATIENT)
Dept: AUDIOLOGY | Facility: CLINIC | Age: 60
End: 2025-08-15
Payer: MEDICARE

## 2025-08-15 DIAGNOSIS — Z96.21 COCHLEAR IMPLANT IN PLACE: ICD-10-CM

## 2025-08-15 DIAGNOSIS — R26.89 BALANCE DISORDER: ICD-10-CM

## 2025-08-15 DIAGNOSIS — H90.3 SENSORINEURAL HEARING LOSS, BILATERAL: Primary | ICD-10-CM

## 2025-12-03 ENCOUNTER — APPOINTMENT (OUTPATIENT)
Dept: RHEUMATOLOGY | Facility: CLINIC | Age: 60
End: 2025-12-03
Payer: MEDICARE

## 2026-01-02 ENCOUNTER — APPOINTMENT (OUTPATIENT)
Dept: AUDIOLOGY | Facility: CLINIC | Age: 61
End: 2026-01-02
Payer: MEDICARE

## 2026-04-29 ENCOUNTER — APPOINTMENT (OUTPATIENT)
Dept: PRIMARY CARE | Facility: CLINIC | Age: 61
End: 2026-04-29
Payer: MEDICARE

## (undated) DEVICE — Device

## (undated) DEVICE — BALL, FLUTED, 6 MM

## (undated) DEVICE — BUR, BALL 0.5MM  DIAMOND EXTENDED LENGTH

## (undated) DEVICE — DRESSING, EAR, GLASSCOCK, ADULT

## (undated) DEVICE — SUTURE, VELOSORB FAST, 5-0 P-13, 18 IN

## (undated) DEVICE — COMB, HAIR, 7 IN, PLASTIC, BLACK

## (undated) DEVICE — DRAPE, SMARTDRAPE, FOR TIVATO MICROSCOPE

## (undated) DEVICE — STRIP, SKIN CLOSURE, STERI STRIP, REINFORCED, 0.5 X 4 IN

## (undated) DEVICE — SPONGE GAUZE, XRAY SC+RFID, 4X4 16 PLY, STERILE

## (undated) DEVICE — TOWEL, SURGICAL, NEURO, O/R, 16 X 26, BLUE, STERILE

## (undated) DEVICE — TAPE, SILK, DURAPORE, 3 IN X 10 YD, LF

## (undated) DEVICE — BUR, 2MM DIAMOND BALL STD

## (undated) DEVICE — SUTURE, VICRYL, 3-0, 27IN, RB-1

## (undated) DEVICE — PREP TRAY, SKIN, DRY, W/GLOVES

## (undated) DEVICE — NEEDLE, HYPODERMIC, 25 G X 1.5 IN, A BEVEL, STERILE

## (undated) DEVICE — SYRINGE, 10 CC, LUER LOCK

## (undated) DEVICE — CATHETER, URETHRAL, MALECOT, 4 WING, 14 FR, LATEX

## (undated) DEVICE — BALL, DIAMOND CUTTER 1.5MM

## (undated) DEVICE — SUTURE, VICRYL, 3-0,18 IN, SH, UNDYED

## (undated) DEVICE — WAX, BONE, 2.5 GM

## (undated) DEVICE — BUR, 3MM DIAMOND BALL STD

## (undated) DEVICE — CLEANER, WIPE, INSTRUMENT, 3.25 X 3.25 IN

## (undated) DEVICE — TAPE, 3IN X 10YD

## (undated) DEVICE — PROTECTOR, NERVE, ULNAR, PINK

## (undated) DEVICE — MANIFOLD, 4 PORT NEPTUNE STANDARD

## (undated) DEVICE — BOWL, BASIN, 32 OZ, STERILE

## (undated) DEVICE — SYRINGE, 60 CC, IRRIGATION, BULB, CONTRO-BULB, PAPER POUCH

## (undated) DEVICE — GLOVE, SURGICAL, PROTEXIS PI , 7.0, PF, LF

## (undated) DEVICE — CUP, SOLUTION

## (undated) DEVICE — SYRINGE, MONOJECT, LUER LOCK, 3 CC, LF

## (undated) DEVICE — CATHETER, IV, ANGIOCATH, 20 G X 1.88 IN, FEP POLYMER

## (undated) DEVICE — SUTURE, MONOCRYL, 4-0, 18 IN, PS2, UNDYED

## (undated) DEVICE — ADHESIVE, SKIN, MASTISOL, 2/3 CC VIAL

## (undated) DEVICE — NEEDLE, HYPODERMIC, MONOJECT, TRI-BEVELED, ANTI-CORING, 25 G X 1.25 IN, LUER LOCK HUB, RED

## (undated) DEVICE — DRAPE, INCISE, ANTIMICROBIAL, IOBAN 2, LARGE, 17 X 23 IN, DISPOSABLE, STERILE

## (undated) DEVICE — BALL, FLUTED, 4 MM

## (undated) DEVICE — ADMINISTRATION SET, VENTED, FLASHBALL, 109 IN

## (undated) DEVICE — CORD, BIPOLAR,  12 FT, DISPOSABLE, LF

## (undated) DEVICE — TUBING, SUCTION, CONNECTING, STERILE 0.25 X 120 IN., LF

## (undated) DEVICE — DRAPE, TOWEL, STERI DRAPE, 17 X 11 IN, PLASTIC, STERILE

## (undated) DEVICE — TUBING, SUCTION, OTOMED

## (undated) DEVICE — ELECTRODE, ELECTROSURGICAL, BLADE, INSULATED, ENT/IMA, STERILE

## (undated) DEVICE — NEEDLE, HYPODERMIC, MONOJECT 250, 18 G X 1.5 IN

## (undated) DEVICE — COTTON BALL, LARGE, STERILE

## (undated) DEVICE — BLADE, RAZOR, CUSTOM, STERILE

## (undated) DEVICE — 12MM MEDTRONIC PAIRED SUBDERMAL ELECTRODE, 2-CHANNEL, 12.0MM***DUPLICATE, PLEASE TRANSITION TO CAT #8227410

## (undated) DEVICE — REST, HEAD, BAGEL, 9 IN

## (undated) DEVICE — COVER, CART, 45 X 27 X 48 IN, CLEAR

## (undated) DEVICE — SPONGE, GAUZE, XRAY DECT, 16 PLY, 4 X 4, W/MASTER DMT,STERILE

## (undated) DEVICE — ELECTRODE, PAIRED SUBDERMAL OTO